# Patient Record
Sex: FEMALE | Race: WHITE | Employment: FULL TIME | URBAN - METROPOLITAN AREA
[De-identification: names, ages, dates, MRNs, and addresses within clinical notes are randomized per-mention and may not be internally consistent; named-entity substitution may affect disease eponyms.]

---

## 2018-01-25 PROBLEM — K80.50 CHOLEDOCHOLITHIASIS: Status: ACTIVE | Noted: 2018-01-25

## 2018-01-25 PROBLEM — Z97.5 IUD (INTRAUTERINE DEVICE) IN PLACE: Status: ACTIVE | Noted: 2018-01-25

## 2018-01-25 PROBLEM — D64.9 ANEMIA, UNSPECIFIED TYPE: Status: ACTIVE | Noted: 2018-01-25

## 2018-02-07 NOTE — ANESTHESIA POSTPROCEDURE EVALUATION
Patient: Rody Morales    Procedure Summary     Date:  02/07/18 Room / Location:  [address] [place] [address] ENDOSCOPY 01 / [address] [place] [address] ENDOSCOPY    Anesthesia Start:  7681 Anesthesia Stop:      Procedures:       ENDOSCOPIC RETROGRADE CHOLANGIOPANCREATOGRAPHY (ERCP) (N/A )

## 2018-02-07 NOTE — H&P
RE-PROCEDURE UPDATE    HPI: Cherie Alex is a 32year old female. 10/9/1990. Patient presents for an ERCP and EUS. ALLERGIES: No Known Allergies      No current outpatient prescriptions on file. History reviewed.  No pertinent past medic

## 2018-02-07 NOTE — ANESTHESIA PREPROCEDURE EVALUATION
Anesthesia PreOp Note    HPI:     Joshua Chappell is a 32year old female who presents for preoperative consultation requested by: Javi Ribeiro MD    Date of Surgery: 2/7/2018    Procedure(s):  ENDOSCOPIC RETROGRADE CHOLANGIOPANCREATOGRAPHY Value Date    01/25/2018   K 4.0 01/25/2018    01/25/2018   CO2 26.7 01/25/2018   BUN 13 01/25/2018   CREATSERUM 0.64 01/25/2018   GLU 84 01/25/2018          Vital Signs:   There is no height or weight on file to calculate BMI.   vitals were not

## 2018-02-07 NOTE — OPERATIVE REPORT
EUS ERCP REPORT    Patient Name:  Siri Knight, [address] [employer] Record #: [id number]  YOB: 1990  Date of Procedure: 2/7/2018    Referring physician: Durga Irwin MD     EUS  ERCP with biliary stent removal and CBD stone extraction

## 2018-02-08 NOTE — PROGRESS NOTES
Patent's cousin translated for patient. Patient stated that she had heartburn like chest pain post procedure and that Dr Rosamaria Cartagena was aware of it and said it was to be expected. The discomfort is almost gone today.  Instructed  patient to call Dr Rosamaria Cartagena if pa

## 2019-06-03 NOTE — PROGRESS NOTES
6/3/2019  9:21 AM    Diya Tinoco is a 29year old female. Chief complaint(s): Patient presents with:  New Patient: Pap Smear was done on 9/2018. Physical    HPI:     Diya Tinoco primary complaint is regarding CPE.      Diya Tinoco is a 2 today's visit):    No current outpatient medications on file. Allergies:  No Known Allergies      ROS:   Review of Systems   Constitutional: Negative for appetite change, chills, diaphoresis, fatigue and fever.    HENT: Negative for ear pain, hearing los rub.    No murmur heard. Pulmonary/Chest: No respiratory distress. Right breast exhibits no mass, no nipple discharge and no skin change. Left breast exhibits no mass, no nipple discharge and no skin change. Lungs clear to auscultation bilaterally.    Ab testing Screening     Gyn Additional Information       NOTE:  The Pap smear is a screening test that aids in the detection of cervical cancer and its precursors. False negative and false positive results can occur.  Regular sampling is recommended to minim foods); athletic conditioning, fluids; low fat milk, limit to less than 20 oz. a day; dental care with her dentist), and healthy habits & social competence & responsibilities: Recommendations on physical activity; exercise daily or at least 3 times a week

## 2019-06-07 NOTE — TELEPHONE ENCOUNTER
Dr. Francisco Clark pt called about her test results  And she was inform of your message below. Noted the TSH is 4.630 high ok for her to continue the same Thyroid dose?       Ref Range & Units 6/3/19 10:03 AM    TSH 0.358 - 3.740 mIU/mL 4.630High       Notes recorded b

## 2019-06-08 NOTE — TELEPHONE ENCOUNTER
Dr Darby Xioa, please advise on lab orders, enter diagnosis. Advised patient on Dr. Madonna Santiago information and recommendations. Patient verbalized understanding.      [id number]

## 2019-10-22 NOTE — PROGRESS NOTES
10/22/2019 1:39 PM    [address], : 10/9/1990  Patient presents with:  Sore Throat: sore throat, chest congestion, cough, ear pain, fever, chills,     HPI:     [address] is a 34year old female who presents for evaluation Surgical History:   Past Surgical History:   Procedure Laterality Date   •   x2   • CHOLECYSTECTOMY     • ENDOSCOPIC RETROGRADE CHOLANGIOPANCREATOGRAPHY (ERCP) N/A 2018    Performed by Sadiq Argueta MD at [address] [place] [address] on file    Social History Narrative      Not on file      IMMUNIZATIONS:  Immunization History  Administered            Date(s) Administered    TDAP                  06/03/2019      Allergies: No Known Allergies    Medications: No current outpatient medica capsule, Rfl: 0. LABORATORY & ORDERS: Blood test(s) ordered today ; Orders Placed This Encounter      POC Rapid Strep [[id number]]     RECOMMENDATIONS given include: Please, call our office with any questions or concerns.  Notify the doctor if there is a deter

## 2020-04-23 NOTE — TELEPHONE ENCOUNTER
Spoke with pt,  verified and she stated she missed a call from Dr Macario Lepe. pls call her back [phone].   TY.

## 2020-04-24 ENCOUNTER — TELEPHONE (OUTPATIENT)
Dept: FAMILY MEDICINE CLINIC | Facility: CLINIC | Age: 30
End: 2020-04-24

## 2020-05-02 NOTE — TELEPHONE ENCOUNTER
With assist of Language Line Left Message To Call Back    Several attempts to contact pt with no response. No response letter mailed to pt address on file.   MARLINE Clark

## 2020-06-09 NOTE — PATIENT INSTRUCTIONS
Acné   El acné es mich inflamación de la piel. Elle la adolescencia, hay un aumento en la producción de aceites de la piel en el abisai, el lisandro, el [place], la parte superior de la espalda y la parte superior de [place].  Estos aceites pueden obstru © 4675-6542 [address]. [address] [place], [address] [facility]. Todos los derechos reservados. Esta información no pretende sustituir la atención médica profesional. Sólo quinones médico puede diagnosticar y tratar un problema de shalonda. · Prueba de TSH. Esta prueba mide la cantidad de hormona estimulante de la tiroides (TSH, por gene siglas en inglés) que produce la glándula pituitaria.  Determina si el funcionamiento de la glándula tiroidea es normal, hiperactivo (hipertiroidismo) o defici Si tiene un nódulo, le realizarán mich aspiración con aguja. Es un tipo de biopsia. Radonna Ramirez es un procedimiento en el que se extrae mich muestra pequeña de tejido.  La FNA es la mejor prueba para determinar si las células de la glándula tiroidea son cance

## 2020-06-09 NOTE — PROGRESS NOTES
Joanie Proctor is a 34year old female.   HPI:   Patient here for noted painful lesions in face, states she has long h/o acne, has only used OTC products with poor response but has noted over the last few months new lesions that she is not able to squeeze w Conjunctiva/sclera: Conjunctivae normal.      Pupils: Pupils are equal, round, and reactive to light. Neck:      Musculoskeletal: Normal range of motion and neck supple. Cardiovascular:      Rate and Rhythm: Normal rate and regular rhythm.       Pulses:

## 2020-07-28 NOTE — PROGRESS NOTES
Bryce Pappas is a 34year old female. HPI:   Here for follow-up on last appointment, she has noted mild improvement in lesions in face and discomfort but no resolution.     Patient also complaining about significant pressure in tonsils, she has recurrent Mouth/Throat:      Pharynx: Oropharynx is clear. Uvula midline. Tonsils: No tonsillar exudate or tonsillar abscesses. Swellin+ on the right. 4+ on the left. Cardiovascular:      Rate and Rhythm: Normal rate and regular rhythm.       Heart so

## 2020-07-30 NOTE — PROGRESS NOTES
Ramona Freitas is a 34year old female. Patient presents with:   Tonsillitis: enlarged tonsils  Throat Problem: at times pt feels something stuck in her throat , per pt staets PCP told her due to enlarged tonsils       HISTORY OF PRESENT ILLNESS  She prese 12/12/2017         REVIEW OF SYSTEMS    System Neg/Pos Details   Constitutional Negative Fatigue, fever and weight loss. ENMT Negative Drooling. Eyes Negative Blurred vision and vision changes. Respiratory Negative Dyspnea and wheezing.    Cardio [employer] the right turbinates - Right: Normal, Left: Normal.  Nasal mucosa–congested       Current Outpatient Medications:   •  Loratadine-Pseudoephedrine ER 5-120 MG Oral Tablet 12 Hr, Take 1 tablet by mouth every 12 (twelve) hours. , Disp: 60 tablet, Rfl: 3  •  Az

## 2020-09-10 NOTE — TELEPHONE ENCOUNTER
With assist of COLEEN# [id number]  Action Requested: Summary for Provider     []  Critical Lab, Recommendations Needed  [] Need Additional Advice  []   FYI    []   Need Orders  [] Need Medications Sent to Pharmacy  []  Other     SUMMARY:Requesting Covid test  Pt s

## 2020-09-10 NOTE — PROGRESS NOTES
Virtual Telephone Check-In    Annalee Gonzalez verbally consents to a Virtual/Telephone Check-In visit on 09/10/20. Patient has been referred to the [facility] website at [email or web address] to review the yearly Consent to Treat document.     Patient Levell Hunger Lacy Adameer or the [facility] if there is a deterioration or worsening of the medical condition. Also, inform the doctor with any new symptoms or medications' side effects. Patient to call us or got to the ER if develops any SOB or difficult breathing.

## 2020-09-10 NOTE — TELEPHONE ENCOUNTER
Spoke with the patient who verbalized consent to a doximity visit. Appointment was scheduled for today 9/10/20.

## 2020-09-14 NOTE — TELEPHONE ENCOUNTER
Spoke with the patient and made her aware of the result from below. Patient voiced understanding. Patient requested a copy of the lab result to be left at the  for her , Zoanne Cabot, to .  Patient reports her  will pass by

## 2021-01-20 NOTE — TELEPHONE ENCOUNTER
With Language Line  Marwyatt Alejo ID# [id number]    Verified name and . Patient states that she has had chronic issues with acne and has seen Dr. Mary Ellen Escobar for her cystic acne before.  She states that she is having another flare up that has last for abou

## 2021-01-21 NOTE — PATIENT INSTRUCTIONS
Behavioral Health Assessment and Treatment    SETTING UP A BEHAVIORAL HEALTH ASSESSMENT    To schedule an assessment at any of our below locations call:   [phone]. Emergency walk-in assessments are available 24/7 at our [facility].

## 2021-01-21 NOTE — PROGRESS NOTES
De Licona is a 27year old female. HPI:   Multiple complaints, she had very good response to acne treatment but after she is still medication symptoms have now recurred.   Her mother has history of diabetes as well as her , she recently underwe Positive for behavioral problems and sleep disturbance. The patient is nervous/anxious.            EXAM:   /82 (BP Location: Left arm, Patient Position: Sitting, Cuff Size: adult)   Pulse 74   Ht 5' 3\" (1.6 m)   Wt 167 lb (75.8 kg)   LMP 01/05/2021 Screening cholesterol level    - LIPID PANEL    4. Palpitations  likely related to current stress and anxiety, will obtain baseline EKG  - EKG 12-LEAD; Future    5.  Adjustment disorder with mixed anxiety and depressed mood  with the stressors at home, prov

## 2021-04-27 NOTE — PROGRESS NOTES
4/27/2021  10:22 AM    Darrick Roldan is a 27year old female.     Chief complaint(s): Patient presents with:  Abnormal Liver Enzymes: Patient was seen in ER and told to have abn LFT levels   Anxiety: very anxious after a recent trauma, a younger x 1 month    Vaping Use      Vaping Use: Never used    Alcohol use: Yes      Comment: occasional    Drug use: Never       Immunizations:     Immunization History  Administered            Date(s) Administered    FLULAVAL 6 months & older 0.5 ml Pref Breastfeeding No   BMI 29.94 kg/m²     Physical Exam  Vitals reviewed. Constitutional:       General: She is not in acute distress. Appearance: Normal appearance. HENT:      Head: Normocephalic.    Eyes:      Conjunctiva/sclera: Conjunctivae normal. in the near future. Notify Dr Annetta Hassan or the [address] [place] [address] [place] if there is a deterioration or worsening of the medical condition. Also, inform the doctor with any new symptoms or medications' side effects.       FOLLOW-UP: Schedule a follow-up visit in  we

## 2021-05-27 NOTE — PROGRESS NOTES
5/27/2021  10:26 AM    Dwaine Berrios is a 27year old female.     Chief complaint(s): Patient presents with:  Test Results: f/u discuss ultrasound results   elevated LFTs  amenorrhea  HPI:     [facility] co Yes      Comment: occasional    Drug use: Never       Immunizations:     Immunization History  Administered            Date(s) Administered    FLULAVAL 6 months & older 0.5 ml Prefilled syringe (21124)                          01/21/2021      TDAP /87 (BP Location: Left arm, Patient Position: Sitting, Cuff Size: adult)   Pulse 70   Ht 5' 3\" (1.6 m)   Wt 174 lb (78.9 kg)   LMP 02/27/2021   Breastfeeding No   BMI 30.82 kg/m²     Physical Exam  Vitals reviewed.    Constitutional:       General: S ASSESSMENT/PLAN:   Assessment   Elevated lfts  (primary encounter diagnosis)  Fatty liver  Slow transit constipation  Amenorrhea    1. Elevated LFTs  2.  Fatty liver    RECOMMENDATIONS given include: Patient was reassured of  her medical condition and a deterioration or worsening of the medical condition. Also, inform the doctor with any new symptoms or medications' side effects. FOLLOW-UP: Schedule a follow-up visit in 1 month.          Orders This Visit:  No orders of the defined types were placed i

## 2021-11-23 NOTE — PROGRESS NOTES
11/23/2021  1:07 PM    Gabriela Osei is a 32year old female. Chief complaint(s): Patient presents with:  ER F/U: right hand injury    HPI:     Gabriela Osei primary complaint is regarding right thumb laceration. Used      Tobacco comment: socially when she was younger x 1 month    Vaping Use      Vaping Use: Never used    Alcohol use: Yes      Comment: occasional    Drug use: Never       Immunizations:     Immunization History  Administered            Date(s) Admi Neurological: Negative for dizziness and headaches. Psychiatric/Behavioral: Positive for dysphoric mood. The patient is nervous/anxious.         PHYSICAL EXAM:   VS: /78   Pulse 78   Ht 5' 3\" (1.6 m)   Wt 171 lb (77.6 kg)   LMP 02/27/2021   BMI 3 REFERRALS: OP REFERRAL TO PSYCHIATRY, Orders Placed This Encounter      OP REFERRAL TO PSYCHIATRY     RECOMMENDATIONS given include: Patient was reassured of  her medical condition and all questions and concerns were answered.  Patient was informed to p

## 2021-11-29 NOTE — PROGRESS NOTES
11/29/2021  10:14 AM    Caryl Diaz is a 32year old female. Chief complaint(s): Patient presents with:  Suture Removal: f/u    HPI:     Caryl Diaz primary complaint is regarding multiple complaints.      Jeanie Smoking status: Former Smoker        Packs/day: 0.00      Smokeless tobacco: Never Used      Tobacco comment: socially when she was younger x 1 month    Vaping Use      Vaping Use: Never used    Alcohol use: Yes      Comment: occasional    Drug use: Never (BP Location: Left arm, Patient Position: Sitting, Cuff Size: adult)   Pulse 72   Ht 5' 3\" (1.6 m)   Wt 173 lb (78.5 kg)   LMP 10/13/2021 (Approximate)   Breastfeeding No   BMI 30.65 kg/m²     Physical Exam  Vitals reviewed.    Constitutional:       Almarie Parikh Periumbilical abdominal pain  DDx early Ventral hernia    No heavy lifting  Observe for now  MEDICATIONS:   none       RECOMMENDATIONS given include: Patient was reassured of  her medical condition and all questions and concerns were answered.  Patient was

## 2022-02-15 NOTE — TELEPHONE ENCOUNTER
Dr. Azra Camilo     Please see message below     Labs were completed on 2/4/2022 but have not yet been reviewed    Please advise and thank you.   Please reply to dina: CORINA Galicia

## 2022-02-15 NOTE — TELEPHONE ENCOUNTER
Patient received a [facility] notification stating blood test results where ready, but Dr. Jackelyn Wilkes has not viewed. Patient requesting a call back with test results.

## 2022-02-16 NOTE — TELEPHONE ENCOUNTER
With ABRAM August  [place] ID# [id number]. Identified patient's name and . Informed patient of normal labs.

## 2022-03-01 PROBLEM — Z97.5 IUD (INTRAUTERINE DEVICE) IN PLACE: Status: RESOLVED | Noted: 2018-01-25 | Resolved: 2022-03-01

## 2022-03-17 NOTE — PROGRESS NOTES
Had Ob RN OB Education over the phone. Missed menses apt with:AIDEN  LMP: 21 Apro[employer] date    Pre  BMI: 30  EPDS score:3  +UPT at home: 2/3/22  +UPT in office: 22    US: 22  Working YENI: 10/13/22  Hx of genetic abnormality in family: Brother with mental retardation per pt but dx is unknown  Flu Vaccine: Yes up to date per pt  Covid Vaccine: Yes up to date per pt     Consent (if needed): Plans to have a repeat        Sterilization/Contraception: Interested in sterilization, stated spouce is considering a vasectomy. OUD Screening: Pt. Answered YES to any 5P questions and/or  risk factors are present. Pt's father had hx of alcohol abuse. Pt declined referral since she is connected with therapist and sees them regularly due to hx of anxiety. SBIRT completed. Pt. Education was provided on OUD and pregnancy, including the benefits of treatment for mom and baby and the risk of ADDISON. Pt to be giiven OUD and ADDISON handouts. Educational material reviewed with patient: Prenatal care, nutrition, weight gain recommendations, travel, exercise, intercourse, pregnancy changes, safe medications, pregnancy and work, fetal movement, labor and  labor, warning signs, food safety, tdap, cord blood, breastfeeding, circumcision, and Group B strep. Blood transfusion if needed: Yes    PN labs: Noted PCP ordered standard prenatal labs. Will add 24 hour protein for hx of pregnancy induced  hypertension per pt. Optional genetic screening labs were reviewed: Michelet Peguero, FTS with US, Quad screen MSAFP and CF screening. Pt interested in [address]. Order and referral to be made. Pt is HMO.    NOB apt: Apt scheduled with AIDEN

## 2022-03-17 NOTE — TELEPHONE ENCOUNTER
Please notify patient that in general patients can continue normal activity and work in pregnancy. No restrictions are placed. You may provide her with a note that says she would benefit from not lifting greater than 35 pounds and having the opportunity to use the bathroom every 2 hours. This does not constitute disability and patient needs to be aware of that. Her employer will ultimately decide what she can and cannot do.

## 2022-03-17 NOTE — TELEPHONE ENCOUNTER
Prenatal patient requesting work note for her employer with lifting restriction and request to be able to take bathrooms breaks as needed. 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 Please advise.

## 2022-04-08 PROBLEM — O34.219 PREVIOUS CESAREAN DELIVERY, ANTEPARTUM CONDITION OR COMPLICATION: Status: ACTIVE | Noted: 2022-04-08

## 2022-04-08 PROBLEM — O34.219 PREVIOUS CESAREAN DELIVERY, ANTEPARTUM CONDITION OR COMPLICATION (HCC): Status: ACTIVE | Noted: 2022-04-08

## 2022-04-11 ENCOUNTER — TELEPHONE (OUTPATIENT)
Dept: FAMILY MEDICINE CLINIC | Facility: CLINIC | Age: 32
End: 2022-04-11

## 2022-04-13 NOTE — PROGRESS NOTES
LMP 12/19/2021 (Approximate)         OB ULTRASOUND REPORT   See imaging tab for complete ultrasound report     Single IUP with cardiac activity 141 bpm  Amniotic fluid is normal.  Placenta location is posterior  Nasal bone present. Stated YENI is 10/13/22 the [address] today is consistent with YENI of 10/6/22. Fetus: arms and legs seen suboptimally. Fetal head/skull and abdomen appeared normal. Stomach and bladder seen. Uterus and adnexa appeared normal        The fetus is outside the range to do the First Trimester screen(> 14wks size). We offered cell free DNA testing or wait for QUAD screen. IMPRESSION:   1. IUP @  13w6d  2. Scan consistent with dates  3. No fetal structural abnormalities seen but limited by early gestational age      RECOMMENDATIONS:   1. Anatomy US at 20wks  2. Panorama test drawn today    Patient was scheduled for a  First Trimester screen today. Patient was NOT seen by [facility] physician. This was an ultrasound only encounter (no physician visit). The ultrasound was read by Dr. Farzaneh Anand and the report was sent to Dr. Noni Mann to discuss with the patient. Sanjuan Hodgkin, D.O. Maternal Fetal Medicine     Note to patient and family  The [facility] makes medical notes available to patients in the interest of transparency. However, please be advised that this is a medical document. It is intended as lmos-cn-xnsg communication. It is written and medical language may contain abbreviations or verbiage that are technical and unfamiliar. It may appear blunt or direct. Medical documents are intended to carry relevant information, facts as evident, and the clinical opinion of the practitioner.

## 2022-04-13 NOTE — PROGRESS NOTES
Single IUP with cardiac activity 141 bpm  Amniotic fluid is normal.  Placenta location is posterior  Nasal bone present. Stated YENI is 10/13/22 the [address] today is consistent with YENI of 10/6/22.

## 2022-04-25 NOTE — TELEPHONE ENCOUNTER
Panorama screening results obt  Reviewed by DR Marjorie Hawley    Results:  low risk  Low Risk for Aneuploidies, triploidy and Monosomy X  Fetal Sex: Held for       Pt states understanding  Copy of results sent for scanning into pt record

## 2022-05-11 NOTE — TELEPHONE ENCOUNTER
WITH LANGUAGE LINE LEFT 2 SEPARATE MESSAGES FOR PATIENT TO RETURN CALL. ONE OF THE MESSAGES WAS TO RETURN CALL WITH A CLEAR AND SLOW MESSAGE WITH TELEPHONE NUMBER TO RETURN CALL.

## 2022-05-12 NOTE — TELEPHONE ENCOUNTER
Routed to [facility] THE [facility] in error

## 2022-06-07 ENCOUNTER — HOSPITAL ENCOUNTER (OUTPATIENT)
Dept: PERINATAL CARE | Facility: HOSPITAL | Age: 32
Discharge: HOME | End: 2022-06-07
Attending: OBSTETRICS & GYNECOLOGY
Payer: COMMERCIAL

## 2022-06-07 VITALS
DIASTOLIC BLOOD PRESSURE: 81 MMHG | BODY MASS INDEX: 35 KG/M2 | SYSTOLIC BLOOD PRESSURE: 115 MMHG | WEIGHT: 199 LBS | HEART RATE: 94 BPM

## 2022-06-07 DIAGNOSIS — O99.212 OBESITY AFFECTING PREGNANCY IN SECOND TRIMESTER: Primary | ICD-10-CM

## 2022-06-07 DIAGNOSIS — O99.212 OBESITY AFFECTING PREGNANCY IN SECOND TRIMESTER: ICD-10-CM

## 2022-06-07 PROCEDURE — 76811 OB US DETAILED SNGL FETUS: CPT | Performed by: OBSTETRICS & GYNECOLOGY

## 2022-06-17 ENCOUNTER — ROUTINE PRENATAL (OUTPATIENT)
Dept: OBGYN CLINIC | Facility: CLINIC | Age: 32
End: 2022-06-17
Payer: COMMERCIAL

## 2022-06-17 VITALS — DIASTOLIC BLOOD PRESSURE: 62 MMHG | WEIGHT: 203 LBS | BODY MASS INDEX: 36 KG/M2 | SYSTOLIC BLOOD PRESSURE: 100 MMHG

## 2022-06-17 DIAGNOSIS — Z34.82 ENCOUNTER FOR SUPERVISION OF OTHER NORMAL PREGNANCY IN SECOND TRIMESTER: ICD-10-CM

## 2022-06-17 DIAGNOSIS — O34.219 PREVIOUS CESAREAN DELIVERY, ANTEPARTUM CONDITION OR COMPLICATION: Primary | ICD-10-CM

## 2022-06-17 LAB
APPEARANCE: CLEAR
BILIRUBIN: NEGATIVE
GLUCOSE (URINE DIPSTICK): NEGATIVE MG/DL
KETONES (URINE DIPSTICK): NEGATIVE MG/DL
LEUKOCYTES: NEGATIVE
MULTISTIX LOT#: 1062 NUMERIC
NITRITE, URINE: NEGATIVE
OCCULT BLOOD: NEGATIVE
PH, URINE: 7 (ref 4.5–8)
PROTEIN (URINE DIPSTICK): NEGATIVE MG/DL
SPECIFIC GRAVITY: 1.01 (ref 1–1.03)
URINE-COLOR: YELLOW
UROBILINOGEN,SEMI-QN: 0.2 MG/DL (ref 0–1.9)

## 2022-06-17 RX ORDER — SWAB
1 SWAB, NON-MEDICATED MISCELLANEOUS DAILY
Qty: 90 TABLET | Refills: 1 | Status: SHIPPED | OUTPATIENT
Start: 2022-06-17 | End: 2022-07-17

## 2022-07-08 ENCOUNTER — TELEPHONE (OUTPATIENT)
Dept: OBGYN CLINIC | Facility: CLINIC | Age: 32
End: 2022-07-08

## 2022-07-08 NOTE — TELEPHONE ENCOUNTER
Pt has an appointment today with Dentist for a cleaning and they are needing a clearance note.  Please advise Fax #[phone]

## 2022-07-10 ENCOUNTER — HOSPITAL ENCOUNTER (EMERGENCY)
Facility: HOSPITAL | Age: 32
Discharge: HOME | End: 2022-07-10
Attending: EMERGENCY MEDICINE
Payer: COMMERCIAL

## 2022-07-10 VITALS
OXYGEN SATURATION: 99 % | RESPIRATION RATE: 19 BRPM | HEART RATE: 86 BPM | SYSTOLIC BLOOD PRESSURE: 133 MMHG | HEIGHT: 63 IN | DIASTOLIC BLOOD PRESSURE: 82 MMHG | BODY MASS INDEX: 37.21 KG/M2 | WEIGHT: 210 LBS | TEMPERATURE: 98 F

## 2022-07-10 DIAGNOSIS — E87.6 HYPOKALEMIA: ICD-10-CM

## 2022-07-10 DIAGNOSIS — R20.2 PARESTHESIAS: Primary | ICD-10-CM

## 2022-07-10 LAB
ANION GAP SERPL CALC-SCNC: 9 MMOL/L (ref 0–18)
BASOPHILS # BLD AUTO: 0.03 X10(3) UL (ref 0–0.2)
BASOPHILS NFR BLD AUTO: 0.3 %
BUN BLD-MCNC: 7 MG/DL (ref 7–18)
BUN/CREAT SERPL: 13 (ref 10–20)
CALCIUM BLD-MCNC: 8.4 MG/DL (ref 8.5–10.1)
CHLORIDE SERPL-SCNC: 109 MMOL/L (ref 98–112)
CO2 SERPL-SCNC: 22 MMOL/L (ref 21–32)
CREAT BLD-MCNC: 0.54 MG/DL
D DIMER PPP FEU-MCNC: 0.53 UG/ML FEU (ref ?–0.5)
DEPRECATED RDW RBC AUTO: 42.6 FL (ref 35.1–46.3)
EOSINOPHIL # BLD AUTO: 0.16 X10(3) UL (ref 0–0.7)
EOSINOPHIL NFR BLD AUTO: 1.6 %
ERYTHROCYTE [DISTWIDTH] IN BLOOD BY AUTOMATED COUNT: 13.1 % (ref 11–15)
GLUCOSE BLD-MCNC: 105 MG/DL (ref 70–99)
HCT VFR BLD AUTO: 33.8 %
HGB BLD-MCNC: 11.4 G/DL
IMM GRANULOCYTES # BLD AUTO: 0.04 X10(3) UL (ref 0–1)
IMM GRANULOCYTES NFR BLD: 0.4 %
LYMPHOCYTES # BLD AUTO: 3.26 X10(3) UL (ref 1–4)
LYMPHOCYTES NFR BLD AUTO: 31.7 %
MCH RBC QN AUTO: 30.1 PG (ref 26–34)
MCHC RBC AUTO-ENTMCNC: 33.7 G/DL (ref 31–37)
MCV RBC AUTO: 89.2 FL
MONOCYTES # BLD AUTO: 0.47 X10(3) UL (ref 0.1–1)
MONOCYTES NFR BLD AUTO: 4.6 %
NEUTROPHILS # BLD AUTO: 6.32 X10 (3) UL (ref 1.5–7.7)
NEUTROPHILS # BLD AUTO: 6.32 X10(3) UL (ref 1.5–7.7)
NEUTROPHILS NFR BLD AUTO: 61.4 %
OSMOLALITY SERPL CALC.SUM OF ELEC: 288 MOSM/KG (ref 275–295)
PLATELET # BLD AUTO: 266 10(3)UL (ref 150–450)
POTASSIUM SERPL-SCNC: 3.4 MMOL/L (ref 3.5–5.1)
RBC # BLD AUTO: 3.79 X10(6)UL
SODIUM SERPL-SCNC: 140 MMOL/L (ref 136–145)
WBC # BLD AUTO: 10.3 X10(3) UL (ref 4–11)

## 2022-07-10 PROCEDURE — 80048 BASIC METABOLIC PNL TOTAL CA: CPT | Performed by: EMERGENCY MEDICINE

## 2022-07-10 PROCEDURE — 85379 FIBRIN DEGRADATION QUANT: CPT | Performed by: EMERGENCY MEDICINE

## 2022-07-10 PROCEDURE — 99283 EMERGENCY DEPT VISIT LOW MDM: CPT

## 2022-07-10 PROCEDURE — 85379 FIBRIN DEGRADATION QUANT: CPT

## 2022-07-10 PROCEDURE — 36415 COLL VENOUS BLD VENIPUNCTURE: CPT

## 2022-07-10 PROCEDURE — 80048 BASIC METABOLIC PNL TOTAL CA: CPT

## 2022-07-10 PROCEDURE — 85025 COMPLETE CBC W/AUTO DIFF WBC: CPT

## 2022-07-10 PROCEDURE — 85025 COMPLETE CBC W/AUTO DIFF WBC: CPT | Performed by: EMERGENCY MEDICINE

## 2022-07-10 RX ORDER — POTASSIUM CHLORIDE 1.5 G/1.77G
40 POWDER, FOR SOLUTION ORAL ONCE
Status: COMPLETED | OUTPATIENT
Start: 2022-07-10 | End: 2022-07-10

## 2022-07-10 NOTE — ED QUICK NOTES
Pt strength equal bilateral for upper and lower extremities. No facial droop noted. Equal bilateral sensation in upper extremities, Sensation R > L in lower extremities.

## 2022-07-10 NOTE — ED INITIAL ASSESSMENT (HPI)
Pt arrives to ED with c/o 27 weeks pregnant. Pt states she's been having a tingling sensation in bilateral hands and left leg that been happening off and on but more frequent today. Pt also reports left leg pain.

## 2022-07-14 ENCOUNTER — OFFICE VISIT (OUTPATIENT)
Dept: FAMILY MEDICINE CLINIC | Facility: CLINIC | Age: 32
End: 2022-07-14
Payer: COMMERCIAL

## 2022-07-14 VITALS
SYSTOLIC BLOOD PRESSURE: 132 MMHG | HEART RATE: 102 BPM | DIASTOLIC BLOOD PRESSURE: 88 MMHG | BODY MASS INDEX: 35.79 KG/M2 | HEIGHT: 63 IN | WEIGHT: 202 LBS

## 2022-07-14 DIAGNOSIS — G56.03 BILATERAL CARPAL TUNNEL SYNDROME: Primary | ICD-10-CM

## 2022-07-14 DIAGNOSIS — G57.12 LATERAL FEMORAL CUTANEOUS ENTRAPMENT SYNDROME, LEFT: ICD-10-CM

## 2022-07-14 PROCEDURE — 3075F SYST BP GE 130 - 139MM HG: CPT | Performed by: FAMILY MEDICINE

## 2022-07-14 PROCEDURE — 99213 OFFICE O/P EST LOW 20 MIN: CPT | Performed by: FAMILY MEDICINE

## 2022-07-14 PROCEDURE — 3079F DIAST BP 80-89 MM HG: CPT | Performed by: FAMILY MEDICINE

## 2022-07-14 PROCEDURE — 3008F BODY MASS INDEX DOCD: CPT | Performed by: FAMILY MEDICINE

## 2022-07-26 ENCOUNTER — LAB ENCOUNTER (OUTPATIENT)
Dept: LAB | Facility: HOSPITAL | Age: 32
End: 2022-07-26
Attending: OBSTETRICS & GYNECOLOGY
Payer: COMMERCIAL

## 2022-07-26 ENCOUNTER — ROUTINE PRENATAL (OUTPATIENT)
Dept: OBGYN CLINIC | Facility: CLINIC | Age: 32
End: 2022-07-26
Payer: COMMERCIAL

## 2022-07-26 VITALS — WEIGHT: 204 LBS | BODY MASS INDEX: 36 KG/M2 | DIASTOLIC BLOOD PRESSURE: 70 MMHG | SYSTOLIC BLOOD PRESSURE: 112 MMHG

## 2022-07-26 DIAGNOSIS — Z34.82 ENCOUNTER FOR SUPERVISION OF OTHER NORMAL PREGNANCY IN SECOND TRIMESTER: ICD-10-CM

## 2022-07-26 DIAGNOSIS — Z34.83 ENCOUNTER FOR SUPERVISION OF OTHER NORMAL PREGNANCY IN THIRD TRIMESTER: Primary | ICD-10-CM

## 2022-07-26 LAB
APPEARANCE: CLEAR
BASOPHILS # BLD AUTO: 0.04 X10(3) UL (ref 0–0.2)
BASOPHILS NFR BLD AUTO: 0.4 %
BILIRUBIN: NEGATIVE
DEPRECATED RDW RBC AUTO: 42.7 FL (ref 35.1–46.3)
EOSINOPHIL # BLD AUTO: 0.16 X10(3) UL (ref 0–0.7)
EOSINOPHIL NFR BLD AUTO: 1.7 %
ERYTHROCYTE [DISTWIDTH] IN BLOOD BY AUTOMATED COUNT: 13.1 % (ref 11–15)
GLUCOSE (URINE DIPSTICK): NEGATIVE MG/DL
GLUCOSE 1H P GLC SERPL-MCNC: 117 MG/DL
HCT VFR BLD AUTO: 33.9 %
HGB BLD-MCNC: 11.3 G/DL
IMM GRANULOCYTES # BLD AUTO: 0.05 X10(3) UL (ref 0–1)
IMM GRANULOCYTES NFR BLD: 0.5 %
KETONES (URINE DIPSTICK): NEGATIVE MG/DL
LEUKOCYTES: NEGATIVE
LYMPHOCYTES # BLD AUTO: 2.42 X10(3) UL (ref 1–4)
LYMPHOCYTES NFR BLD AUTO: 25.2 %
MCH RBC QN AUTO: 30 PG (ref 26–34)
MCHC RBC AUTO-ENTMCNC: 33.3 G/DL (ref 31–37)
MCV RBC AUTO: 89.9 FL
MONOCYTES # BLD AUTO: 0.44 X10(3) UL (ref 0.1–1)
MONOCYTES NFR BLD AUTO: 4.6 %
MULTISTIX LOT#: ABNORMAL NUMERIC
NEUTROPHILS # BLD AUTO: 6.51 X10 (3) UL (ref 1.5–7.7)
NEUTROPHILS # BLD AUTO: 6.51 X10(3) UL (ref 1.5–7.7)
NEUTROPHILS NFR BLD AUTO: 67.6 %
NITRITE, URINE: NEGATIVE
PH, URINE: 7 (ref 4.5–8)
PLATELET # BLD AUTO: 240 10(3)UL (ref 150–450)
PROTEIN (URINE DIPSTICK): 30 MG/DL
RBC # BLD AUTO: 3.77 X10(6)UL
SPECIFIC GRAVITY: 1.01 (ref 1–1.03)
URINE-COLOR: YELLOW
UROBILINOGEN,SEMI-QN: 0.2 MG/DL (ref 0–1.9)
WBC # BLD AUTO: 9.6 X10(3) UL (ref 4–11)

## 2022-07-26 PROCEDURE — 81003 URINALYSIS AUTO W/O SCOPE: CPT | Performed by: NURSE PRACTITIONER

## 2022-07-26 PROCEDURE — 90471 IMMUNIZATION ADMIN: CPT | Performed by: NURSE PRACTITIONER

## 2022-07-26 PROCEDURE — 82950 GLUCOSE TEST: CPT

## 2022-07-26 PROCEDURE — 90715 TDAP VACCINE 7 YRS/> IM: CPT | Performed by: NURSE PRACTITIONER

## 2022-07-26 PROCEDURE — 36415 COLL VENOUS BLD VENIPUNCTURE: CPT

## 2022-07-26 PROCEDURE — 3078F DIAST BP <80 MM HG: CPT | Performed by: NURSE PRACTITIONER

## 2022-07-26 PROCEDURE — 3074F SYST BP LT 130 MM HG: CPT | Performed by: NURSE PRACTITIONER

## 2022-07-26 PROCEDURE — 85025 COMPLETE CBC W/AUTO DIFF WBC: CPT

## 2022-07-26 RX ORDER — BREAST PUMP
EACH MISCELLANEOUS
Qty: 1 EACH | Refills: 0 | Status: SHIPPED | OUTPATIENT
Start: 2022-07-26

## 2022-07-27 LAB
APPEARANCE: CLEAR
BILIRUBIN: NEGATIVE
GLUCOSE (URINE DIPSTICK): NEGATIVE MG/DL
KETONES (URINE DIPSTICK): NEGATIVE MG/DL
LEUKOCYTES: NEGATIVE
MULTISTIX LOT#: NORMAL NUMERIC
NITRITE, URINE: NEGATIVE
OCCULT BLOOD: NEGATIVE
PH, URINE: 7 (ref 4.5–8)
PROTEIN (URINE DIPSTICK): NEGATIVE MG/DL
SPECIFIC GRAVITY: 1.01 (ref 1–1.03)
URINE-COLOR: YELLOW
UROBILINOGEN,SEMI-QN: 0.2 MG/DL (ref 0–1.9)

## 2022-08-09 ENCOUNTER — ROUTINE PRENATAL (OUTPATIENT)
Dept: OBGYN CLINIC | Facility: CLINIC | Age: 32
End: 2022-08-09
Payer: COMMERCIAL

## 2022-08-09 VITALS — WEIGHT: 201 LBS | DIASTOLIC BLOOD PRESSURE: 80 MMHG | SYSTOLIC BLOOD PRESSURE: 122 MMHG | BODY MASS INDEX: 36 KG/M2

## 2022-08-09 DIAGNOSIS — Z34.83 ENCOUNTER FOR SUPERVISION OF OTHER NORMAL PREGNANCY IN THIRD TRIMESTER: Primary | ICD-10-CM

## 2022-08-09 LAB
APPEARANCE: CLEAR
BILIRUBIN: NEGATIVE
GLUCOSE (URINE DIPSTICK): NEGATIVE MG/DL
KETONES (URINE DIPSTICK): NEGATIVE MG/DL
LEUKOCYTES: NEGATIVE
MULTISTIX LOT#: NORMAL NUMERIC
NITRITE, URINE: NEGATIVE
OCCULT BLOOD: NEGATIVE
PH, URINE: 5 (ref 4.5–8)
PROTEIN (URINE DIPSTICK): NEGATIVE MG/DL
SPECIFIC GRAVITY: 1 (ref 1–1.03)
URINE-COLOR: YELLOW
UROBILINOGEN,SEMI-QN: 0.2 MG/DL (ref 0–1.9)

## 2022-08-09 PROCEDURE — 0502F SUBSEQUENT PRENATAL CARE: CPT | Performed by: OBSTETRICS & GYNECOLOGY

## 2022-08-09 PROCEDURE — 3079F DIAST BP 80-89 MM HG: CPT | Performed by: OBSTETRICS & GYNECOLOGY

## 2022-08-09 PROCEDURE — 3074F SYST BP LT 130 MM HG: CPT | Performed by: OBSTETRICS & GYNECOLOGY

## 2022-08-09 PROCEDURE — 81002 URINALYSIS NONAUTO W/O SCOPE: CPT | Performed by: OBSTETRICS & GYNECOLOGY

## 2022-08-22 ENCOUNTER — HOSPITAL ENCOUNTER (OUTPATIENT)
Dept: PERINATAL CARE | Facility: HOSPITAL | Age: 32
End: 2022-08-22
Attending: OBSTETRICS & GYNECOLOGY
Payer: COMMERCIAL

## 2022-08-22 ENCOUNTER — HOSPITAL ENCOUNTER (OUTPATIENT)
Dept: PERINATAL CARE | Facility: HOSPITAL | Age: 32
Discharge: HOME | End: 2022-08-22
Attending: OBSTETRICS & GYNECOLOGY
Payer: COMMERCIAL

## 2022-08-22 VITALS
WEIGHT: 201 LBS | DIASTOLIC BLOOD PRESSURE: 81 MMHG | BODY MASS INDEX: 36 KG/M2 | HEART RATE: 96 BPM | SYSTOLIC BLOOD PRESSURE: 122 MMHG

## 2022-08-22 DIAGNOSIS — K80.50 CHOLEDOCHOLITHIASIS: ICD-10-CM

## 2022-08-22 DIAGNOSIS — O99.213 OBESITY AFFECTING PREGNANCY IN THIRD TRIMESTER: ICD-10-CM

## 2022-08-22 DIAGNOSIS — O34.219 PREVIOUS CESAREAN DELIVERY, ANTEPARTUM CONDITION OR COMPLICATION: Primary | ICD-10-CM

## 2022-08-22 DIAGNOSIS — O34.219 PREVIOUS CESAREAN DELIVERY, ANTEPARTUM CONDITION OR COMPLICATION: ICD-10-CM

## 2022-08-22 PROCEDURE — 76819 FETAL BIOPHYS PROFIL W/O NST: CPT

## 2022-08-22 PROCEDURE — 76816 OB US FOLLOW-UP PER FETUS: CPT | Performed by: OBSTETRICS & GYNECOLOGY

## 2022-08-23 ENCOUNTER — ROUTINE PRENATAL (OUTPATIENT)
Dept: OBGYN CLINIC | Facility: CLINIC | Age: 32
End: 2022-08-23
Payer: COMMERCIAL

## 2022-08-23 VITALS — WEIGHT: 211 LBS | BODY MASS INDEX: 37 KG/M2 | DIASTOLIC BLOOD PRESSURE: 70 MMHG | SYSTOLIC BLOOD PRESSURE: 130 MMHG

## 2022-08-23 DIAGNOSIS — Z34.83 ENCOUNTER FOR SUPERVISION OF OTHER NORMAL PREGNANCY IN THIRD TRIMESTER: Primary | ICD-10-CM

## 2022-08-23 LAB
APPEARANCE: CLEAR
BILIRUBIN: NEGATIVE
GLUCOSE (URINE DIPSTICK): NEGATIVE MG/DL
KETONES (URINE DIPSTICK): NEGATIVE MG/DL
LEUKOCYTES: NEGATIVE
MULTISTIX LOT#: ABNORMAL NUMERIC
NITRITE, URINE: NEGATIVE
PH, URINE: 7 (ref 4.5–8)
PROTEIN (URINE DIPSTICK): NEGATIVE MG/DL
SPECIFIC GRAVITY: 1.01 (ref 1–1.03)
URINE-COLOR: YELLOW
UROBILINOGEN,SEMI-QN: 0.2 MG/DL (ref 0–1.9)

## 2022-08-23 PROCEDURE — 3075F SYST BP GE 130 - 139MM HG: CPT | Performed by: OBSTETRICS & GYNECOLOGY

## 2022-08-23 PROCEDURE — 81003 URINALYSIS AUTO W/O SCOPE: CPT | Performed by: OBSTETRICS & GYNECOLOGY

## 2022-08-23 PROCEDURE — 3078F DIAST BP <80 MM HG: CPT | Performed by: OBSTETRICS & GYNECOLOGY

## 2022-08-23 PROCEDURE — 0502F SUBSEQUENT PRENATAL CARE: CPT | Performed by: OBSTETRICS & GYNECOLOGY

## 2022-08-23 NOTE — PROGRESS NOTES
No C/Os. OB ultrasound reviewed. [facility] for ligament pain.

## 2022-08-26 ENCOUNTER — TELEPHONE (OUTPATIENT)
Dept: OBGYN CLINIC | Facility: CLINIC | Age: 32
End: 2022-08-26

## 2022-08-26 NOTE — TELEPHONE ENCOUNTER
ID #:[id number],  Molly Martinez     Patient endorses: intermittent pain x 2 days  Tightening of abdomen , similar to menstrual cramps radiating through pelvis to low back  Rates pain 8/10 on 10 point scale  Frequency q 60 min  Denies VB, LOF   Good FM      Advised to push fluids, and monitor FM and UCs  Labor precautions reviewed. Verbalizes understanding and agrees with plan.

## 2022-09-07 ENCOUNTER — TELEPHONE (OUTPATIENT)
Dept: OBGYN CLINIC | Facility: CLINIC | Age: 32
End: 2022-09-07

## 2022-09-07 NOTE — TELEPHONE ENCOUNTER
Yecenia, [facility] calling to inform form for breast pump will be faxed today, thanks.

## 2022-09-08 ENCOUNTER — TELEPHONE (OUTPATIENT)
Dept: OBGYN CLINIC | Facility: CLINIC | Age: 32
End: 2022-09-08

## 2022-09-13 ENCOUNTER — HOSPITAL ENCOUNTER (OUTPATIENT)
Facility: HOSPITAL | Age: 32
Setting detail: OBSERVATION
Discharge: HOME | End: 2022-09-15
Attending: OBSTETRICS & GYNECOLOGY | Admitting: OBSTETRICS & GYNECOLOGY

## 2022-09-13 ENCOUNTER — ROUTINE PRENATAL (OUTPATIENT)
Dept: OBGYN CLINIC | Facility: CLINIC | Age: 32
End: 2022-09-13
Payer: COMMERCIAL

## 2022-09-13 VITALS — BODY MASS INDEX: 38 KG/M2 | SYSTOLIC BLOOD PRESSURE: 124 MMHG | WEIGHT: 213 LBS | DIASTOLIC BLOOD PRESSURE: 82 MMHG

## 2022-09-13 DIAGNOSIS — Z34.83 ENCOUNTER FOR SUPERVISION OF OTHER NORMAL PREGNANCY IN THIRD TRIMESTER: Primary | ICD-10-CM

## 2022-09-13 PROBLEM — Z34.90 PREGNANCY (HCC): Status: ACTIVE | Noted: 2022-09-13

## 2022-09-13 PROBLEM — Z34.90 PREGNANCY: Status: ACTIVE | Noted: 2022-09-13

## 2022-09-13 PROBLEM — O14.13 SEVERE PRE-ECLAMPSIA IN THIRD TRIMESTER (HCC): Status: ACTIVE | Noted: 2022-09-13

## 2022-09-13 PROBLEM — O14.13 SEVERE PRE-ECLAMPSIA IN THIRD TRIMESTER: Status: ACTIVE | Noted: 2022-09-13

## 2022-09-13 LAB
ALBUMIN SERPL-MCNC: 2.6 G/DL (ref 3.4–5)
ALBUMIN/GLOB SERPL: 0.6 {RATIO} (ref 1–2)
ALP LIVER SERPL-CCNC: 140 U/L
ALT SERPL-CCNC: 17 U/L
ANION GAP SERPL CALC-SCNC: 9 MMOL/L (ref 0–18)
ANTIBODY SCREEN: NEGATIVE
AST SERPL-CCNC: 14 U/L (ref 15–37)
BASOPHILS # BLD AUTO: 0.03 X10(3) UL (ref 0–0.2)
BASOPHILS NFR BLD AUTO: 0.3 %
BILIRUB SERPL-MCNC: 0.3 MG/DL (ref 0.1–2)
BILIRUBIN: NEGATIVE
BUN BLD-MCNC: 10 MG/DL (ref 7–18)
BUN/CREAT SERPL: 13.7 (ref 10–20)
CALCIUM BLD-MCNC: 9.4 MG/DL (ref 8.5–10.1)
CHLORIDE SERPL-SCNC: 109 MMOL/L (ref 98–112)
CO2 SERPL-SCNC: 20 MMOL/L (ref 21–32)
CREAT BLD-MCNC: 0.73 MG/DL
CREAT UR-SCNC: 15.1 MG/DL
DEPRECATED HBV CORE AB SER IA-ACNC: 9.9 NG/ML
DEPRECATED RDW RBC AUTO: 42.1 FL (ref 35.1–46.3)
EOSINOPHIL # BLD AUTO: 0.05 X10(3) UL (ref 0–0.7)
EOSINOPHIL NFR BLD AUTO: 0.5 %
ERYTHROCYTE [DISTWIDTH] IN BLOOD BY AUTOMATED COUNT: 13.3 % (ref 11–15)
FASTING STATUS PATIENT QL REPORTED: NO
GFR SERPLBLD BASED ON 1.73 SQ M-ARVRAT: 113 ML/MIN/1.73M2 (ref 60–?)
GLOBULIN PLAS-MCNC: 4.2 G/DL (ref 2.8–4.4)
GLUCOSE (URINE DIPSTICK): NEGATIVE MG/DL
GLUCOSE BLD-MCNC: 74 MG/DL (ref 70–99)
HCT VFR BLD AUTO: 35.9 %
HGB BLD-MCNC: 12.3 G/DL
HIV 1+2 AB+HIV1 P24 AG SERPL QL IA: NONREACTIVE
IMM GRANULOCYTES # BLD AUTO: 0.04 X10(3) UL (ref 0–1)
IMM GRANULOCYTES NFR BLD: 0.4 %
KETONES (URINE DIPSTICK): NEGATIVE MG/DL
LEUKOCYTES: NEGATIVE
LYMPHOCYTES # BLD AUTO: 2.76 X10(3) UL (ref 1–4)
LYMPHOCYTES NFR BLD AUTO: 28.1 %
MCH RBC QN AUTO: 29.9 PG (ref 26–34)
MCHC RBC AUTO-ENTMCNC: 34.3 G/DL (ref 31–37)
MCV RBC AUTO: 87.3 FL
MONOCYTES # BLD AUTO: 0.46 X10(3) UL (ref 0.1–1)
MONOCYTES NFR BLD AUTO: 4.7 %
MULTISTIX LOT#: ABNORMAL NUMERIC
NEUTROPHILS # BLD AUTO: 6.49 X10 (3) UL (ref 1.5–7.7)
NEUTROPHILS # BLD AUTO: 6.49 X10(3) UL (ref 1.5–7.7)
NEUTROPHILS NFR BLD AUTO: 66 %
NITRITE, URINE: NEGATIVE
OCCULT BLOOD: NEGATIVE
OSMOLALITY SERPL CALC.SUM OF ELEC: 284 MOSM/KG (ref 275–295)
PH, URINE: 8 (ref 4.5–8)
PLATELET # BLD AUTO: 232 10(3)UL (ref 150–450)
POTASSIUM SERPL-SCNC: 3.6 MMOL/L (ref 3.5–5.1)
PROT SERPL-MCNC: 6.8 G/DL (ref 6.4–8.2)
PROT UR-MCNC: 26.1 MG/DL
PROT/CREAT UR-RTO: 1.73
PROTEIN (URINE DIPSTICK): >=300 MG/DL
RBC # BLD AUTO: 4.11 X10(6)UL
RH BLOOD TYPE: POSITIVE
RH BLOOD TYPE: POSITIVE
SARS-COV-2 RNA RESP QL NAA+PROBE: NOT DETECTED
SODIUM SERPL-SCNC: 138 MMOL/L (ref 136–145)
SPECIFIC GRAVITY: 1 (ref 1–1.03)
URINE-COLOR: YELLOW
UROBILINOGEN,SEMI-QN: 0.2 MG/DL (ref 0–1.9)
WBC # BLD AUTO: 9.8 X10(3) UL (ref 4–11)

## 2022-09-13 PROCEDURE — 82570 ASSAY OF URINE CREATININE: CPT | Performed by: OBSTETRICS & GYNECOLOGY

## 2022-09-13 PROCEDURE — 87653 STREP B DNA AMP PROBE: CPT | Performed by: OBSTETRICS & GYNECOLOGY

## 2022-09-13 PROCEDURE — 87081 CULTURE SCREEN ONLY: CPT | Performed by: OBSTETRICS & GYNECOLOGY

## 2022-09-13 PROCEDURE — 82728 ASSAY OF FERRITIN: CPT | Performed by: OBSTETRICS & GYNECOLOGY

## 2022-09-13 PROCEDURE — 86701 HIV-1ANTIBODY: CPT | Performed by: OBSTETRICS & GYNECOLOGY

## 2022-09-13 PROCEDURE — 84156 ASSAY OF PROTEIN URINE: CPT | Performed by: OBSTETRICS & GYNECOLOGY

## 2022-09-13 PROCEDURE — 86780 TREPONEMA PALLIDUM: CPT | Performed by: OBSTETRICS & GYNECOLOGY

## 2022-09-13 PROCEDURE — 96372 THER/PROPH/DIAG INJ SC/IM: CPT

## 2022-09-13 PROCEDURE — 85025 COMPLETE CBC W/AUTO DIFF WBC: CPT | Performed by: OBSTETRICS & GYNECOLOGY

## 2022-09-13 PROCEDURE — 99214 OFFICE O/P EST MOD 30 MIN: CPT

## 2022-09-13 PROCEDURE — 86901 BLOOD TYPING SEROLOGIC RH(D): CPT | Performed by: OBSTETRICS & GYNECOLOGY

## 2022-09-13 PROCEDURE — 86900 BLOOD TYPING SEROLOGIC ABO: CPT | Performed by: OBSTETRICS & GYNECOLOGY

## 2022-09-13 PROCEDURE — 36415 COLL VENOUS BLD VENIPUNCTURE: CPT

## 2022-09-13 PROCEDURE — 80053 COMPREHEN METABOLIC PANEL: CPT | Performed by: OBSTETRICS & GYNECOLOGY

## 2022-09-13 PROCEDURE — 86850 RBC ANTIBODY SCREEN: CPT | Performed by: OBSTETRICS & GYNECOLOGY

## 2022-09-13 RX ORDER — SODIUM CHLORIDE, SODIUM LACTATE, POTASSIUM CHLORIDE, CALCIUM CHLORIDE 600; 310; 30; 20 MG/100ML; MG/100ML; MG/100ML; MG/100ML
INJECTION, SOLUTION INTRAVENOUS CONTINUOUS
Status: DISCONTINUED | OUTPATIENT
Start: 2022-09-13 | End: 2022-09-15

## 2022-09-13 RX ORDER — BETAMETHASONE SODIUM PHOSPHATE AND BETAMETHASONE ACETATE 3; 3 MG/ML; MG/ML
12 INJECTION, SUSPENSION INTRA-ARTICULAR; INTRALESIONAL; INTRAMUSCULAR; SOFT TISSUE EVERY 24 HOURS
Status: COMPLETED | OUTPATIENT
Start: 2022-09-13 | End: 2022-09-14

## 2022-09-13 RX ORDER — HYDRALAZINE HYDROCHLORIDE 20 MG/ML
10 INJECTION INTRAMUSCULAR; INTRAVENOUS ONCE AS NEEDED
Status: DISCONTINUED | OUTPATIENT
Start: 2022-09-13 | End: 2022-09-15

## 2022-09-13 RX ORDER — LABETALOL HYDROCHLORIDE 5 MG/ML
20 INJECTION, SOLUTION INTRAVENOUS ONCE AS NEEDED
Status: DISCONTINUED | OUTPATIENT
Start: 2022-09-13 | End: 2022-09-15

## 2022-09-13 RX ORDER — LABETALOL HYDROCHLORIDE 5 MG/ML
40 INJECTION, SOLUTION INTRAVENOUS ONCE AS NEEDED
Status: DISCONTINUED | OUTPATIENT
Start: 2022-09-13 | End: 2022-09-15

## 2022-09-13 RX ORDER — LABETALOL HYDROCHLORIDE 5 MG/ML
80 INJECTION, SOLUTION INTRAVENOUS ONCE AS NEEDED
Status: DISCONTINUED | OUTPATIENT
Start: 2022-09-13 | End: 2022-09-15

## 2022-09-13 NOTE — PLAN OF CARE
Pt is a 32year old female admitted to 371/371-A. Patient presents with:  R/o Pih: MELVIN and vision changes. BP normal in office. protein in urine >300  Scheduled : 1530 on 9/15/22  Proteinuria     Pt is  35w5d intra-uterine pregnancy. History obtained, consents signed. Oriented to room, staff, and plan of care.

## 2022-09-13 NOTE — PROGRESS NOTES
Pt c/o visual changes and headache yesterday, bp 124/82 today and protein 300mg,  Pt sent immediately for evaluation for r/o preeclampsia in FB,   Dilma moreno appraised.

## 2022-09-14 LAB — T PALLIDUM AB SER QL: NEGATIVE

## 2022-09-14 PROCEDURE — 96372 THER/PROPH/DIAG INJ SC/IM: CPT

## 2022-09-14 PROCEDURE — 96360 HYDRATION IV INFUSION INIT: CPT

## 2022-09-14 PROCEDURE — 59025 FETAL NON-STRESS TEST: CPT

## 2022-09-14 PROCEDURE — 96361 HYDRATE IV INFUSION ADD-ON: CPT

## 2022-09-14 RX ORDER — ACETAMINOPHEN 500 MG
1000 TABLET ORAL EVERY 6 HOURS PRN
Status: DISCONTINUED | OUTPATIENT
Start: 2022-09-14 | End: 2022-09-15

## 2022-09-14 RX ORDER — DOCUSATE SODIUM 100 MG/1
100 CAPSULE, LIQUID FILLED ORAL 2 TIMES DAILY
Status: DISCONTINUED | OUTPATIENT
Start: 2022-09-14 | End: 2022-09-15

## 2022-09-14 RX ORDER — CHOLECALCIFEROL (VITAMIN D3) 25 MCG
1 TABLET,CHEWABLE ORAL DAILY
Status: DISCONTINUED | OUTPATIENT
Start: 2022-09-14 | End: 2022-09-15

## 2022-09-14 RX ORDER — BETAMETHASONE SODIUM PHOSPHATE AND BETAMETHASONE ACETATE 3; 3 MG/ML; MG/ML
INJECTION, SUSPENSION INTRA-ARTICULAR; INTRALESIONAL; INTRAMUSCULAR; SOFT TISSUE
Status: COMPLETED
Start: 2022-09-14 | End: 2022-09-14

## 2022-09-14 NOTE — PROGRESS NOTES
Pt is a 32year old female admitted to 372/372-A. Patient presents with:  R/o Pih: MELVIN and vision changes. BP normal in office. protein in urine >300  Scheduled : 1530 on 9/15/22  Proteinuria     Pt is  35w5d intra-uterine pregnancy. History obtained, consents signed. Oriented to room, staff, and plan of care.          Pt currently transferred to room 372 in ambulatory state in stable conditions

## 2022-09-14 NOTE — CM/SW NOTE
MDO to NABILA for OUD screen. Per chart review, pt was prescribed Norco for an injury to her finger that she sustained while at work. Pt endorses usinf for 2 months and is no longer taking. NABILA/MEGGAN to remain available for support and/or discharge planning.      ALONDRA Tillman, [facility]  Social Work   QVN:#[id number]

## 2022-09-15 VITALS
BODY MASS INDEX: 37.73 KG/M2 | HEART RATE: 93 BPM | WEIGHT: 212.94 LBS | RESPIRATION RATE: 16 BRPM | SYSTOLIC BLOOD PRESSURE: 116 MMHG | TEMPERATURE: 98 F | HEIGHT: 63 IN | OXYGEN SATURATION: 97 % | DIASTOLIC BLOOD PRESSURE: 70 MMHG

## 2022-09-15 PROBLEM — O14.93 PRE-ECLAMPSIA IN THIRD TRIMESTER (HCC): Status: ACTIVE | Noted: 2022-09-13

## 2022-09-15 PROBLEM — O14.93 PRE-ECLAMPSIA IN THIRD TRIMESTER: Status: ACTIVE | Noted: 2022-09-13

## 2022-09-15 LAB
ALBUMIN SERPL-MCNC: 2.5 G/DL (ref 3.4–5)
ALBUMIN/GLOB SERPL: 0.6 {RATIO} (ref 1–2)
ALP LIVER SERPL-CCNC: 131 U/L
ALT SERPL-CCNC: 15 U/L
ANION GAP SERPL CALC-SCNC: 10 MMOL/L (ref 0–18)
AST SERPL-CCNC: 10 U/L (ref 15–37)
BASOPHILS # BLD AUTO: 0.02 X10(3) UL (ref 0–0.2)
BASOPHILS NFR BLD AUTO: 0.2 %
BILIRUB SERPL-MCNC: 0.2 MG/DL (ref 0.1–2)
BUN BLD-MCNC: 11 MG/DL (ref 7–18)
BUN/CREAT SERPL: 16.7 (ref 10–20)
CALCIUM BLD-MCNC: 8.7 MG/DL (ref 8.5–10.1)
CHLORIDE SERPL-SCNC: 107 MMOL/L (ref 98–112)
CO2 SERPL-SCNC: 20 MMOL/L (ref 21–32)
CREAT BLD-MCNC: 0.66 MG/DL
DEPRECATED RDW RBC AUTO: 44.3 FL (ref 35.1–46.3)
EOSINOPHIL # BLD AUTO: 0 X10(3) UL (ref 0–0.7)
EOSINOPHIL NFR BLD AUTO: 0 %
ERYTHROCYTE [DISTWIDTH] IN BLOOD BY AUTOMATED COUNT: 13.6 % (ref 11–15)
GFR SERPLBLD BASED ON 1.73 SQ M-ARVRAT: 120 ML/MIN/1.73M2 (ref 60–?)
GLOBULIN PLAS-MCNC: 4.2 G/DL (ref 2.8–4.4)
GLUCOSE BLD-MCNC: 152 MG/DL (ref 70–99)
GROUP B STREP BY PCR FOR PCR OVT: NEGATIVE
HCT VFR BLD AUTO: 35.4 %
HGB BLD-MCNC: 11.9 G/DL
IMM GRANULOCYTES # BLD AUTO: 0.14 X10(3) UL (ref 0–1)
IMM GRANULOCYTES NFR BLD: 1.3 %
LYMPHOCYTES # BLD AUTO: 2.06 X10(3) UL (ref 1–4)
LYMPHOCYTES NFR BLD AUTO: 19.2 %
MCH RBC QN AUTO: 30 PG (ref 26–34)
MCHC RBC AUTO-ENTMCNC: 33.6 G/DL (ref 31–37)
MCV RBC AUTO: 89.2 FL
MONOCYTES # BLD AUTO: 0.44 X10(3) UL (ref 0.1–1)
MONOCYTES NFR BLD AUTO: 4.1 %
NEUTROPHILS # BLD AUTO: 8.06 X10 (3) UL (ref 1.5–7.7)
NEUTROPHILS # BLD AUTO: 8.06 X10(3) UL (ref 1.5–7.7)
NEUTROPHILS NFR BLD AUTO: 75.2 %
OSMOLALITY SERPL CALC.SUM OF ELEC: 286 MOSM/KG (ref 275–295)
PLATELET # BLD AUTO: 224 10(3)UL (ref 150–450)
POTASSIUM SERPL-SCNC: 3.9 MMOL/L (ref 3.5–5.1)
PROT SERPL-MCNC: 6.7 G/DL (ref 6.4–8.2)
RBC # BLD AUTO: 3.97 X10(6)UL
SODIUM SERPL-SCNC: 137 MMOL/L (ref 136–145)
WBC # BLD AUTO: 10.7 X10(3) UL (ref 4–11)

## 2022-09-15 PROCEDURE — 85025 COMPLETE CBC W/AUTO DIFF WBC: CPT | Performed by: OBSTETRICS & GYNECOLOGY

## 2022-09-15 PROCEDURE — 59025 FETAL NON-STRESS TEST: CPT

## 2022-09-15 PROCEDURE — 80053 COMPREHEN METABOLIC PANEL: CPT | Performed by: OBSTETRICS & GYNECOLOGY

## 2022-09-15 NOTE — PLAN OF CARE
Problem: Patient Centered Care  Goal: Patient preferences are identified and integrated in the patient's plan of care  Description: Interventions:  - What would you like us to know as we care for you? Having a baby boy. Two boys at home.   - Provide timely, complete, and accurate information to patient/family  - Incorporate patient and family knowledge, values, beliefs, and cultural backgrounds into the planning and delivery of care  - Encourage patient/family to participate in care and decision-making at the level they choose  - Honor patient and family perspectives and choices  2022 by Christopher Wahl RN  Outcome: Progressing  2022 by Christopher Wahl RN  Outcome: Progressing     Problem: BIRTH - VAGINAL/ SECTION  Goal: Fetal and maternal status remain reassuring during the birth process  Description: INTERVENTIONS:  - Monitor vital signs  - Monitor fetal heart rate  - Monitor uterine activity  - Monitor labor progression (vaginal delivery)  - DVT prophylaxis (C/S delivery)  - Surgical antibiotic prophylaxis (C/S delivery)  Outcome: Progressing     Problem: PAIN - ADULT  Goal: Verbalizes/displays adequate comfort level or patient's stated pain goal  Description: INTERVENTIONS:  - Encourage pt to monitor pain and request assistance  - Assess pain using appropriate pain scale  - Administer analgesics based on type and severity of pain and evaluate response  - Implement non-pharmacological measures as appropriate and evaluate response  - Consider cultural and social influences on pain and pain management  - Manage/alleviate anxiety  - Utilize distraction and/or relaxation techniques  - Monitor for opioid side effects  - Notify MD/LIP if interventions unsuccessful or patient reports new pain  - Anticipate increased pain with activity and pre-medicate as appropriate  Outcome: Progressing     Problem: ANXIETY  Goal: Will report anxiety at manageable levels  Description: INTERVENTIONS:  - Administer medication as ordered  - Teach and rehearse alternative coping skills  - Provide emotional support with 1:1 interaction with staff  Outcome: Progressing

## 2022-09-15 NOTE — PROGRESS NOTES
MD spoke to patient. Patient is being discharged home and has a scheduled NST with IKER liangw, 9/16/2022. Pt educated on pre-e s/s and verbalizes understanding. All pt questions answered.

## 2022-09-16 ENCOUNTER — HOSPITAL ENCOUNTER (OUTPATIENT)
Dept: PERINATAL CARE | Facility: HOSPITAL | Age: 32
Discharge: HOME | End: 2022-09-16
Attending: OBSTETRICS & GYNECOLOGY
Payer: COMMERCIAL

## 2022-09-16 DIAGNOSIS — E66.9 OBESITY: Primary | ICD-10-CM

## 2022-09-16 DIAGNOSIS — O14.93 PRE-ECLAMPSIA IN THIRD TRIMESTER: ICD-10-CM

## 2022-09-16 PROCEDURE — 59025 FETAL NON-STRESS TEST: CPT

## 2022-09-16 NOTE — ADDENDUM NOTE
Encounter addended by: Karly Esquivel RN on: 9/16/2022 12:40 PM   Actions taken: Visit diagnoses modified, Order list changed, Diagnosis association updated, Flowsheet accepted

## 2022-09-19 ENCOUNTER — HOSPITAL ENCOUNTER (OUTPATIENT)
Facility: HOSPITAL | Age: 32
Discharge: HOME | End: 2022-09-19
Attending: OBSTETRICS & GYNECOLOGY | Admitting: OBSTETRICS & GYNECOLOGY

## 2022-09-19 ENCOUNTER — APPOINTMENT (OUTPATIENT)
Dept: OBGYN CLINIC | Facility: HOSPITAL | Age: 32
End: 2022-09-19

## 2022-09-19 ENCOUNTER — TELEPHONE (OUTPATIENT)
Dept: OBGYN CLINIC | Facility: CLINIC | Age: 32
End: 2022-09-19

## 2022-09-19 VITALS — DIASTOLIC BLOOD PRESSURE: 80 MMHG | SYSTOLIC BLOOD PRESSURE: 123 MMHG | HEART RATE: 92 BPM | RESPIRATION RATE: 18 BRPM

## 2022-09-19 PROCEDURE — 99213 OFFICE O/P EST LOW 20 MIN: CPT

## 2022-09-19 PROCEDURE — 59025 FETAL NON-STRESS TEST: CPT

## 2022-09-19 NOTE — PROGRESS NOTES
Pt is a 32year old female admitted to TR1/TR1-A. Patient presents with:  Non-stress Test: preeclampsia     Pt is  36w4d intra-uterine pregnancy. History obtained, consents signed. Oriented to room, staff, and plan of care.

## 2022-09-19 NOTE — TELEPHONE ENCOUNTER
Please contact patient and schedule her for a prenatal visit with me on 9/19/2022 at 12:50 PM or add her to the schedule for 9/20/2022.

## 2022-09-19 NOTE — TELEPHONE ENCOUNTER
Pt Name and  verified. Patient informed and verbalized understanding.  Pt scheduled with BASSAM tomorrow at 9 am

## 2022-09-19 NOTE — TRIAGE
[facility] HOSP - [facility]      Triage Note    Luisontie 6 Patient Status:  Outpatient    10/9/1990 MRN [id number]   Location [address] Attending Tommy Schafer MD   Hosp Day # 0 PCP MD Alan Kellogg Books: [id number]  Estimated Date of Delivery: 10/13/22  Gestation: 36w4d    Chief Complaint     Non-stress Test          Allergies:  No Known Allergies    No orders of the defined types were placed in this encounter.       Lab Results   Component Value Date    WBC 10.7 09/15/2022    HGB 11.9 (L) 09/15/2022    HCT 35.4 09/15/2022    .0 09/15/2022    CREATSERUM 0.66 09/15/2022    BUN 11 09/15/2022     09/15/2022    K 3.9 09/15/2022     09/15/2022    CO2 20.0 (L) 09/15/2022     (H) 09/15/2022    CA 8.7 09/15/2022    ALB 2.5 (L) 09/15/2022    ALKPHO 131 (H) 09/15/2022    BILT 0.2 09/15/2022    TP 6.7 09/15/2022    AST 10 (L) 09/15/2022    ALT 15 09/15/2022    T4F 0.9 06/10/2020    TSH 2.300 2022    MIRA 48 2018    LIP 79 2018    DDIMER 0.53 (H) 07/10/2022    ESRML 26 (H) 2018       Clinitek UA  Lab Results   Component Value Date    GLUUR Negative 2019    SPECGRAVITY 1.005 2022    URINECUL <10,000 CFU/ML Gram negative edilia 2022       UA  Lab Results   Component Value Date    COLORUR Colorless (A) 2019    CLARITY Clear 2019    SPECGRAVITY 1.005 2022    PROUR Negative 2019    GLUUR Negative 2019    KETUR Negative 2019    BILUR Negative 2019    BLOODURINE Negative 2019    NITRITE Negative 2022    UROBILINOGEN <2.0 2019    LEUUR Negative 2019    UASA Negative 2019  1630   BP: 123/80   BP Location: Left arm   Pulse: 92   Resp: 18       NST  Variability: Moderate           Accelerations: Yes           Decelerations: None            Baseline: 145 BPM           Uterine Irritability: No           Contractions: Irregular                                        Acoustic Stimulator: No           Nonstress Test Interpretation: Reactive           Nonstress Test Second Interpretation: Reactive          FHR Category: Category I             Additional Comments Comments: Bedside ELVIS 7.7; pt to have repeat  on Thursday this week.      Patient presents with:  Non-stress Test: preeclampsia        Elissa Collins RN  2022 6:18 PM    Patient seen and ultrasound done   Vtx  Grade 3 Placenta  ELVIS 7.7  RNST     Being seen in office Tomorrow with Dr Michele Kohli MD

## 2022-09-20 ENCOUNTER — TELEPHONE (OUTPATIENT)
Dept: OBGYN CLINIC | Facility: CLINIC | Age: 32
End: 2022-09-20

## 2022-09-20 ENCOUNTER — LAB ENCOUNTER (OUTPATIENT)
Dept: LAB | Age: 32
End: 2022-09-20
Attending: OBSTETRICS & GYNECOLOGY

## 2022-09-20 ENCOUNTER — ROUTINE PRENATAL (OUTPATIENT)
Dept: OBGYN CLINIC | Facility: CLINIC | Age: 32
End: 2022-09-20

## 2022-09-20 VITALS — WEIGHT: 212 LBS | DIASTOLIC BLOOD PRESSURE: 84 MMHG | SYSTOLIC BLOOD PRESSURE: 124 MMHG | BODY MASS INDEX: 38 KG/M2

## 2022-09-20 DIAGNOSIS — Z34.83 ENCOUNTER FOR SUPERVISION OF OTHER NORMAL PREGNANCY IN THIRD TRIMESTER: ICD-10-CM

## 2022-09-20 DIAGNOSIS — Z34.83 ENCOUNTER FOR SUPERVISION OF OTHER NORMAL PREGNANCY IN THIRD TRIMESTER: Primary | ICD-10-CM

## 2022-09-20 LAB
ANTIBODY SCREEN: NEGATIVE
BASOPHILS # BLD AUTO: 0.04 X10(3) UL (ref 0–0.2)
BASOPHILS NFR BLD AUTO: 0.4 %
BILIRUBIN: NEGATIVE
DEPRECATED RDW RBC AUTO: 44 FL (ref 35.1–46.3)
EOSINOPHIL # BLD AUTO: 0.11 X10(3) UL (ref 0–0.7)
EOSINOPHIL NFR BLD AUTO: 1.2 %
ERYTHROCYTE [DISTWIDTH] IN BLOOD BY AUTOMATED COUNT: 13.5 % (ref 11–15)
GLUCOSE (URINE DIPSTICK): NEGATIVE MG/DL
HCT VFR BLD AUTO: 35.9 %
HGB BLD-MCNC: 12.2 G/DL
IMM GRANULOCYTES # BLD AUTO: 0.06 X10(3) UL (ref 0–1)
IMM GRANULOCYTES NFR BLD: 0.6 %
KETONES (URINE DIPSTICK): NEGATIVE MG/DL
LYMPHOCYTES # BLD AUTO: 3 X10(3) UL (ref 1–4)
LYMPHOCYTES NFR BLD AUTO: 31.6 %
MCH RBC QN AUTO: 30.2 PG (ref 26–34)
MCHC RBC AUTO-ENTMCNC: 34 G/DL (ref 31–37)
MCV RBC AUTO: 88.9 FL
MONOCYTES # BLD AUTO: 0.41 X10(3) UL (ref 0.1–1)
MONOCYTES NFR BLD AUTO: 4.3 %
MULTISTIX LOT#: ABNORMAL NUMERIC
NEUTROPHILS # BLD AUTO: 5.87 X10 (3) UL (ref 1.5–7.7)
NEUTROPHILS # BLD AUTO: 5.87 X10(3) UL (ref 1.5–7.7)
NEUTROPHILS NFR BLD AUTO: 61.9 %
NITRITE, URINE: NEGATIVE
OCCULT BLOOD: NEGATIVE
PH, URINE: 7.5 (ref 4.5–8)
PLATELET # BLD AUTO: 229 10(3)UL (ref 150–450)
RBC # BLD AUTO: 4.04 X10(6)UL
RH BLOOD TYPE: POSITIVE
SPECIFIC GRAVITY: 1 (ref 1–1.03)
URINE-COLOR: YELLOW
UROBILINOGEN,SEMI-QN: 0.2 MG/DL (ref 0–1.9)
WBC # BLD AUTO: 9.5 X10(3) UL (ref 4–11)

## 2022-09-20 PROCEDURE — 81002 URINALYSIS NONAUTO W/O SCOPE: CPT | Performed by: OBSTETRICS & GYNECOLOGY

## 2022-09-20 PROCEDURE — 3074F SYST BP LT 130 MM HG: CPT | Performed by: OBSTETRICS & GYNECOLOGY

## 2022-09-20 PROCEDURE — 36415 COLL VENOUS BLD VENIPUNCTURE: CPT | Performed by: OBSTETRICS & GYNECOLOGY

## 2022-09-20 PROCEDURE — 85025 COMPLETE CBC W/AUTO DIFF WBC: CPT

## 2022-09-20 PROCEDURE — 3079F DIAST BP 80-89 MM HG: CPT | Performed by: OBSTETRICS & GYNECOLOGY

## 2022-09-20 PROCEDURE — 86850 RBC ANTIBODY SCREEN: CPT | Performed by: OBSTETRICS & GYNECOLOGY

## 2022-09-20 PROCEDURE — 86901 BLOOD TYPING SEROLOGIC RH(D): CPT | Performed by: OBSTETRICS & GYNECOLOGY

## 2022-09-20 PROCEDURE — 86900 BLOOD TYPING SEROLOGIC ABO: CPT | Performed by: OBSTETRICS & GYNECOLOGY

## 2022-09-20 NOTE — TELEPHONE ENCOUNTER
Orders placed for CBC, Type&Screen, and Covid test. Pt to go to lab after appt in [place]. C/section scheduled for 09/22/2022 @ 6615. msg sent to [facility] to confirm.  Assist pending

## 2022-09-21 LAB — SARS-COV-2 RNA RESP QL NAA+PROBE: NOT DETECTED

## 2022-09-21 NOTE — H&P
[facility]    PATIENT'S NAME: RAVINDER Patterson   ATTENDING PHYSICIAN: Rui Gamble MD   PATIENT ACCOUNT#:   [id number]    LOCATION:    MEDICAL RECORD #:   [id number]       YOB: 1990  ADMISSION DATE:       2022    HISTORY AND PHYSICAL EXAMINATION    HISTORY OF PRESENT ILLNESS:  The patient is a 19-year-old female,  3, para 2-0-0-2, who is scheduled for repeat  section at 37 weeks on Thursday, 2022. The patient has a history of 2 previous  sections. She will be 37 weeks, and the reason for  section at this time is due to preeclampsia without severe features. The patient has been taking home blood pressures, and they have not been in the severe range. The risks of surgery have been reviewed with the patient including, but not limited to, injury to the bowel, bladder, major blood vessels, injury to other organs, risk of blood transfusion, DVT, pulmonary embolism, reoperation, as well as the risk of anesthesia. The patient expresses understanding and desires to proceed on Thursday for her repeat  section. PAST MEDICAL HISTORY:  Anemia. PAST SURGICAL HISTORY:  Cholecystectomy and 2 previous  sections. MEDICATIONS:  Prenatal vitamin. ALLERGIES:  No known drug allergies. FAMILY HISTORY:  Significant for diabetes and hypertension. SOCIAL HISTORY:  Patient is a nonsmoker. No alcohol use and no illicit drug use. REVIEW OF SYSTEMS:  A 12-point review of systems is unremarkable. PHYSICAL EXAMINATION:    GENERAL:  The patient is a well-nourished female in no acute distress. VITAL SIGNS:  Blood pressure 124/84, pulse 92, respirations 16, and weight 212 pounds. NECK:  Supple without thyromegaly. LUNGS:  Clear to auscultation. HEART:  Regular rate and rhythm with normal S1, S2.  BREASTS:  No masses. No adenopathy. No nipple discharge. ABDOMEN:  Gravid. No hepatosplenomegaly.    EXTREMITIES: No edema. No calf tenderness. ASSESSMENT:  Patient is 37 weeks with 2 previous  sections and is scheduled for repeat  section. PLAN:  Patient has a repeat  section scheduled for . She is to be n.p.o. after midnight, and she will go for her preoperative and COVID testing prior to the day of surgery. Dictated By Silva Batista MD  d: 2022 15:27:57  t: 2022 16:41:57  [facility] [id number]  ML/

## 2022-09-22 ENCOUNTER — ANESTHESIA (OUTPATIENT)
Dept: OBGYN UNIT | Facility: HOSPITAL | Age: 32
End: 2022-09-22
Payer: COMMERCIAL

## 2022-09-22 ENCOUNTER — HOSPITAL ENCOUNTER (INPATIENT)
Facility: HOSPITAL | Age: 32
LOS: 3 days | Discharge: HOME | End: 2022-09-25
Attending: OBSTETRICS & GYNECOLOGY | Admitting: OBSTETRICS & GYNECOLOGY
Payer: COMMERCIAL

## 2022-09-22 ENCOUNTER — ANESTHESIA EVENT (OUTPATIENT)
Dept: OBGYN UNIT | Facility: HOSPITAL | Age: 32
End: 2022-09-22
Payer: COMMERCIAL

## 2022-09-22 PROBLEM — O34.219 PREVIOUS CESAREAN SECTION COMPLICATING PREGNANCY, ANTEPARTUM CONDITION OR COMPLICATION: Status: ACTIVE | Noted: 2022-04-08

## 2022-09-22 PROBLEM — O34.219 PREVIOUS CESAREAN SECTION COMPLICATING PREGNANCY, ANTEPARTUM CONDITION OR COMPLICATION (HCC): Status: ACTIVE | Noted: 2022-04-08

## 2022-09-22 LAB
BASOPHILS # BLD AUTO: 0.03 X10(3) UL (ref 0–0.2)
BASOPHILS NFR BLD AUTO: 0.2 %
DEPRECATED RDW RBC AUTO: 43.3 FL (ref 35.1–46.3)
EOSINOPHIL # BLD AUTO: 0.01 X10(3) UL (ref 0–0.7)
EOSINOPHIL NFR BLD AUTO: 0.1 %
ERYTHROCYTE [DISTWIDTH] IN BLOOD BY AUTOMATED COUNT: 13.4 % (ref 11–15)
HCT VFR BLD AUTO: 38.6 %
HGB BLD-MCNC: 13.3 G/DL
IMM GRANULOCYTES # BLD AUTO: 0.06 X10(3) UL (ref 0–1)
IMM GRANULOCYTES NFR BLD: 0.4 %
LYMPHOCYTES # BLD AUTO: 1.66 X10(3) UL (ref 1–4)
LYMPHOCYTES NFR BLD AUTO: 12.1 %
MCH RBC QN AUTO: 30.6 PG (ref 26–34)
MCHC RBC AUTO-ENTMCNC: 34.5 G/DL (ref 31–37)
MCV RBC AUTO: 88.7 FL
MONOCYTES # BLD AUTO: 0.29 X10(3) UL (ref 0.1–1)
MONOCYTES NFR BLD AUTO: 2.1 %
NEUTROPHILS # BLD AUTO: 11.7 X10 (3) UL (ref 1.5–7.7)
NEUTROPHILS # BLD AUTO: 11.7 X10(3) UL (ref 1.5–7.7)
NEUTROPHILS NFR BLD AUTO: 85.1 %
PLATELET # BLD AUTO: 237 10(3)UL (ref 150–450)
RBC # BLD AUTO: 4.35 X10(6)UL
WBC # BLD AUTO: 13.8 X10(3) UL (ref 4–11)

## 2022-09-22 PROCEDURE — 59510 CESAREAN DELIVERY: CPT | Performed by: OBSTETRICS & GYNECOLOGY

## 2022-09-22 PROCEDURE — 59514 CESAREAN DELIVERY ONLY: CPT | Performed by: OBSTETRICS & GYNECOLOGY

## 2022-09-22 RX ORDER — AMMONIA INHALANTS 0.04 G/.3ML
0.3 INHALANT RESPIRATORY (INHALATION) AS NEEDED
Status: DISCONTINUED | OUTPATIENT
Start: 2022-09-22 | End: 2022-09-25

## 2022-09-22 RX ORDER — SIMETHICONE 80 MG
80 TABLET,CHEWABLE ORAL 3 TIMES DAILY PRN
Status: DISCONTINUED | OUTPATIENT
Start: 2022-09-22 | End: 2022-09-25

## 2022-09-22 RX ORDER — ONDANSETRON 2 MG/ML
4 INJECTION INTRAMUSCULAR; INTRAVENOUS ONCE AS NEEDED
Status: ACTIVE | OUTPATIENT
Start: 2022-09-22 | End: 2022-09-22

## 2022-09-22 RX ORDER — MORPHINE SULFATE 1 MG/ML
INJECTION, SOLUTION EPIDURAL; INTRATHECAL; INTRAVENOUS AS NEEDED
Status: DISCONTINUED | OUTPATIENT
Start: 2022-09-22 | End: 2022-09-22 | Stop reason: SURG

## 2022-09-22 RX ORDER — NALBUPHINE HCL 10 MG/ML
2.5 AMPUL (ML) INJECTION
Status: DISCONTINUED | OUTPATIENT
Start: 2022-09-22 | End: 2022-09-25

## 2022-09-22 RX ORDER — HYDROMORPHONE HYDROCHLORIDE 1 MG/ML
0.4 INJECTION, SOLUTION INTRAMUSCULAR; INTRAVENOUS; SUBCUTANEOUS
Status: DISPENSED | OUTPATIENT
Start: 2022-09-22 | End: 2022-09-23

## 2022-09-22 RX ORDER — BUPIVACAINE HYDROCHLORIDE 7.5 MG/ML
INJECTION, SOLUTION INTRASPINAL AS NEEDED
Status: DISCONTINUED | OUTPATIENT
Start: 2022-09-22 | End: 2022-09-22 | Stop reason: SURG

## 2022-09-22 RX ORDER — ACETAMINOPHEN 500 MG
1000 TABLET ORAL ONCE
Status: COMPLETED | OUTPATIENT
Start: 2022-09-22 | End: 2022-09-22

## 2022-09-22 RX ORDER — DIPHENHYDRAMINE HYDROCHLORIDE 50 MG/ML
25 INJECTION INTRAMUSCULAR; INTRAVENOUS ONCE AS NEEDED
OUTPATIENT
Start: 2022-09-22 | End: 2022-09-22

## 2022-09-22 RX ORDER — SODIUM CHLORIDE, SODIUM LACTATE, POTASSIUM CHLORIDE, CALCIUM CHLORIDE 600; 310; 30; 20 MG/100ML; MG/100ML; MG/100ML; MG/100ML
INJECTION, SOLUTION INTRAVENOUS CONTINUOUS
OUTPATIENT
Start: 2022-09-22

## 2022-09-22 RX ORDER — KETOROLAC TROMETHAMINE 30 MG/ML
30 INJECTION, SOLUTION INTRAMUSCULAR; INTRAVENOUS EVERY 6 HOURS
Status: COMPLETED | OUTPATIENT
Start: 2022-09-22 | End: 2022-09-23

## 2022-09-22 RX ORDER — DOCUSATE SODIUM 100 MG/1
100 CAPSULE, LIQUID FILLED ORAL
Status: DISCONTINUED | OUTPATIENT
Start: 2022-09-22 | End: 2022-09-25

## 2022-09-22 RX ORDER — METOCLOPRAMIDE HYDROCHLORIDE 5 MG/ML
10 INJECTION INTRAMUSCULAR; INTRAVENOUS ONCE
Status: COMPLETED | OUTPATIENT
Start: 2022-09-22 | End: 2022-09-22

## 2022-09-22 RX ORDER — ENOXAPARIN SODIUM 100 MG/ML
40 INJECTION SUBCUTANEOUS DAILY
Status: DISCONTINUED | OUTPATIENT
Start: 2022-09-22 | End: 2022-09-25

## 2022-09-22 RX ORDER — TRISODIUM CITRATE DIHYDRATE AND CITRIC ACID MONOHYDRATE 500; 334 MG/5ML; MG/5ML
30 SOLUTION ORAL ONCE
Status: COMPLETED | OUTPATIENT
Start: 2022-09-22 | End: 2022-09-22

## 2022-09-22 RX ORDER — LIDOCAINE HYDROCHLORIDE 10 MG/ML
INJECTION, SOLUTION EPIDURAL; INFILTRATION; INTRACAUDAL; PERINEURAL AS NEEDED
Status: DISCONTINUED | OUTPATIENT
Start: 2022-09-22 | End: 2022-09-22 | Stop reason: SURG

## 2022-09-22 RX ORDER — PROCHLORPERAZINE EDISYLATE 5 MG/ML
5 INJECTION INTRAMUSCULAR; INTRAVENOUS ONCE AS NEEDED
Status: ACTIVE | OUTPATIENT
Start: 2022-09-22 | End: 2022-09-22

## 2022-09-22 RX ORDER — ACETAMINOPHEN 325 MG/1
650 TABLET ORAL EVERY 6 HOURS PRN
Status: ACTIVE | OUTPATIENT
Start: 2022-09-22 | End: 2022-09-23

## 2022-09-22 RX ORDER — METOCLOPRAMIDE 10 MG/1
10 TABLET ORAL ONCE
Status: COMPLETED | OUTPATIENT
Start: 2022-09-22 | End: 2022-09-22

## 2022-09-22 RX ORDER — HYDROMORPHONE HYDROCHLORIDE 1 MG/ML
0.6 INJECTION, SOLUTION INTRAMUSCULAR; INTRAVENOUS; SUBCUTANEOUS
Status: ACTIVE | OUTPATIENT
Start: 2022-09-22 | End: 2022-09-23

## 2022-09-22 RX ORDER — DIPHENHYDRAMINE HYDROCHLORIDE 50 MG/ML
12.5 INJECTION INTRAMUSCULAR; INTRAVENOUS EVERY 4 HOURS PRN
Status: DISCONTINUED | OUTPATIENT
Start: 2022-09-22 | End: 2022-09-23

## 2022-09-22 RX ORDER — DEXAMETHASONE SODIUM PHOSPHATE 4 MG/ML
VIAL (ML) INJECTION AS NEEDED
Status: DISCONTINUED | OUTPATIENT
Start: 2022-09-22 | End: 2022-09-22 | Stop reason: SURG

## 2022-09-22 RX ORDER — IBUPROFEN 600 MG/1
600 TABLET ORAL EVERY 6 HOURS
Status: DISCONTINUED | OUTPATIENT
Start: 2022-09-23 | End: 2022-09-25

## 2022-09-22 RX ORDER — SODIUM CHLORIDE, SODIUM LACTATE, POTASSIUM CHLORIDE, CALCIUM CHLORIDE 600; 310; 30; 20 MG/100ML; MG/100ML; MG/100ML; MG/100ML
125 INJECTION, SOLUTION INTRAVENOUS CONTINUOUS
Status: DISCONTINUED | OUTPATIENT
Start: 2022-09-22 | End: 2022-09-22 | Stop reason: HOSPADM

## 2022-09-22 RX ORDER — ONDANSETRON 2 MG/ML
4 INJECTION INTRAMUSCULAR; INTRAVENOUS EVERY 6 HOURS PRN
Status: DISCONTINUED | OUTPATIENT
Start: 2022-09-22 | End: 2022-09-25

## 2022-09-22 RX ORDER — KETOROLAC TROMETHAMINE 30 MG/ML
30 INJECTION, SOLUTION INTRAMUSCULAR; INTRAVENOUS ONCE AS NEEDED
OUTPATIENT
Start: 2022-09-22 | End: 2022-09-22

## 2022-09-22 RX ORDER — HALOPERIDOL 5 MG/ML
0.5 INJECTION INTRAMUSCULAR ONCE AS NEEDED
Status: ACTIVE | OUTPATIENT
Start: 2022-09-22 | End: 2022-09-22

## 2022-09-22 RX ORDER — ONDANSETRON 2 MG/ML
4 INJECTION INTRAMUSCULAR; INTRAVENOUS ONCE AS NEEDED
OUTPATIENT
Start: 2022-09-22 | End: 2022-09-22

## 2022-09-22 RX ORDER — HYDROCODONE BITARTRATE AND ACETAMINOPHEN 7.5; 325 MG/1; MG/1
2 TABLET ORAL EVERY 6 HOURS PRN
Status: ACTIVE | OUTPATIENT
Start: 2022-09-22 | End: 2022-09-23

## 2022-09-22 RX ORDER — GABAPENTIN 300 MG/1
300 CAPSULE ORAL EVERY 8 HOURS PRN
Status: DISCONTINUED | OUTPATIENT
Start: 2022-09-22 | End: 2022-09-25

## 2022-09-22 RX ORDER — NALBUPHINE HCL 10 MG/ML
2.5 AMPUL (ML) INJECTION EVERY 4 HOURS PRN
Status: ACTIVE | OUTPATIENT
Start: 2022-09-22 | End: 2022-09-23

## 2022-09-22 RX ORDER — DEXTROSE, SODIUM CHLORIDE, SODIUM LACTATE, POTASSIUM CHLORIDE, AND CALCIUM CHLORIDE 5; .6; .31; .03; .02 G/100ML; G/100ML; G/100ML; G/100ML; G/100ML
INJECTION, SOLUTION INTRAVENOUS CONTINUOUS
Status: DISCONTINUED | OUTPATIENT
Start: 2022-09-22 | End: 2022-09-25

## 2022-09-22 RX ORDER — HYDROCODONE BITARTRATE AND ACETAMINOPHEN 7.5; 325 MG/1; MG/1
1 TABLET ORAL EVERY 6 HOURS PRN
Status: ACTIVE | OUTPATIENT
Start: 2022-09-22 | End: 2022-09-23

## 2022-09-22 RX ORDER — CEFAZOLIN SODIUM/WATER 2 G/20 ML
2 SYRINGE (ML) INTRAVENOUS ONCE
Status: COMPLETED | OUTPATIENT
Start: 2022-09-22 | End: 2022-09-22

## 2022-09-22 RX ORDER — DIPHENHYDRAMINE HCL 25 MG
25 CAPSULE ORAL EVERY 4 HOURS PRN
Status: DISCONTINUED | OUTPATIENT
Start: 2022-09-22 | End: 2022-09-23

## 2022-09-22 RX ORDER — ONDANSETRON 2 MG/ML
INJECTION INTRAMUSCULAR; INTRAVENOUS AS NEEDED
Status: DISCONTINUED | OUTPATIENT
Start: 2022-09-22 | End: 2022-09-22 | Stop reason: SURG

## 2022-09-22 RX ORDER — CHOLECALCIFEROL (VITAMIN D3) 25 MCG
1 TABLET,CHEWABLE ORAL DAILY
Status: DISCONTINUED | OUTPATIENT
Start: 2022-09-22 | End: 2022-09-25

## 2022-09-22 RX ORDER — NALOXONE HYDROCHLORIDE 0.4 MG/ML
0.08 INJECTION, SOLUTION INTRAMUSCULAR; INTRAVENOUS; SUBCUTANEOUS
Status: ACTIVE | OUTPATIENT
Start: 2022-09-22 | End: 2022-09-23

## 2022-09-22 RX ORDER — ONDANSETRON 2 MG/ML
4 INJECTION INTRAMUSCULAR; INTRAVENOUS EVERY 6 HOURS PRN
Status: DISCONTINUED | OUTPATIENT
Start: 2022-09-22 | End: 2022-09-22 | Stop reason: HOSPADM

## 2022-09-22 RX ORDER — ACETAMINOPHEN 500 MG
1000 TABLET ORAL EVERY 6 HOURS
Status: DISCONTINUED | OUTPATIENT
Start: 2022-09-22 | End: 2022-09-25

## 2022-09-22 RX ORDER — BISACODYL 10 MG
10 SUPPOSITORY, RECTAL RECTAL ONCE AS NEEDED
Status: DISCONTINUED | OUTPATIENT
Start: 2022-09-22 | End: 2022-09-25

## 2022-09-22 RX ORDER — FAMOTIDINE 20 MG/1
20 TABLET, FILM COATED ORAL ONCE
Status: COMPLETED | OUTPATIENT
Start: 2022-09-22 | End: 2022-09-22

## 2022-09-22 RX ORDER — FAMOTIDINE 10 MG/ML
20 INJECTION, SOLUTION INTRAVENOUS ONCE
Status: COMPLETED | OUTPATIENT
Start: 2022-09-22 | End: 2022-09-22

## 2022-09-22 RX ORDER — FAMOTIDINE 10 MG/ML
INJECTION, SOLUTION INTRAVENOUS
Status: COMPLETED
Start: 2022-09-22 | End: 2022-09-22

## 2022-09-22 RX ADMIN — DEXAMETHASONE SODIUM PHOSPHATE 8 MG: 4 MG/ML VIAL (ML) INJECTION at 08:14:00

## 2022-09-22 RX ADMIN — SODIUM CHLORIDE, SODIUM LACTATE, POTASSIUM CHLORIDE, CALCIUM CHLORIDE: 600; 310; 30; 20 INJECTION, SOLUTION INTRAVENOUS at 08:31:00

## 2022-09-22 RX ADMIN — LIDOCAINE HYDROCHLORIDE 3 ML: 10 INJECTION, SOLUTION EPIDURAL; INFILTRATION; INTRACAUDAL; PERINEURAL at 08:07:00

## 2022-09-22 RX ADMIN — SODIUM CHLORIDE, SODIUM LACTATE, POTASSIUM CHLORIDE, CALCIUM CHLORIDE: 600; 310; 30; 20 INJECTION, SOLUTION INTRAVENOUS at 09:00:00

## 2022-09-22 RX ADMIN — MORPHINE SULFATE 0.2 MG: 1 INJECTION, SOLUTION EPIDURAL; INTRATHECAL; INTRAVENOUS at 08:10:00

## 2022-09-22 RX ADMIN — CEFAZOLIN SODIUM/WATER 2 G: 2 G/20 ML SYRINGE (ML) INTRAVENOUS at 08:14:00

## 2022-09-22 RX ADMIN — ONDANSETRON 4 MG: 2 INJECTION INTRAMUSCULAR; INTRAVENOUS at 08:14:00

## 2022-09-22 RX ADMIN — BUPIVACAINE HYDROCHLORIDE 1.5 ML: 7.5 INJECTION, SOLUTION INTRASPINAL at 08:10:00

## 2022-09-22 NOTE — ANESTHESIA PROCEDURE NOTES
Spinal Block  Performed by: Benjie Castellanos MD  Authorized by: Benjie Castellanos MD       General Information and Staff    Start Time:  9/22/2022 8:07 AM  End Time:  9/22/2022 8:10 AM  Anesthesiologist:  Benjie Castellanos MD  Performed by:   Anesthesiologist  Patient Location:  OB  Site identification: surface landmarks    Preanesthetic Checklist: patient identified, IV checked, risks and benefits discussed, monitors and equipment checked, pre-op evaluation, timeout performed, anesthesia consent and sterile technique used      Procedure Details    Patient Position:  Sitting  Prep: ChloraPrep    Monitoring:  Cardiac monitor  Approach:  Midline  Location:  L3-4  Injection Technique:  Single-shot    Needle    Needle Type:  Pencil-tip  Needle Gauge:  24 G  Needle Length:  3.5 in    Assessment    Sensory Level:  T4  Events: clear CSF, CSF aspirated, well tolerated and blood negative      Additional Comments

## 2022-09-22 NOTE — OPERATIVE REPORT
[facility]    PATIENT'S NAME: RAVINDER Sung   ATTENDING PHYSICIAN: Rui Casper MD   OPERATING PHYSICIAN: Juan Antonio Casper MD   PATIENT ACCOUNT#:   [id number]    LOCATION:  [address] [place] [address] #:   [id number]       YOB: 1990  ADMISSION DATE:       2022      OPERATION DATE:  2022    OPERATIVE REPORT      PREOPERATIVE DIAGNOSIS:  Intrauterine pregnancy at 37 weeks, repeat  section for mild preeclampsia. POSTOPERATIVE DIAGNOSIS:  Intrauterine pregnancy at 37 weeks, repeat  section for mild preeclampsia. PROCEDURE:  Low transverse  section through Pfannenstiel skin incision. ASSISTANT SURGEON:  Luli Shah MD.    ANESTHESIA:  Spinal.       PATHOLOGY:  Placenta and cord. QUANTITATIVE BLOOD LOSS:  Pending. INTRAVENOUS FLUIDS:  2000 mL. URINE OUTPUT:  10 mL and clear. Patient voided just prior to surgery. COUNTS:  Correct x2. COMPLICATIONS:  None. CONDITION:  Stable. FINDINGS:  Viable male infant, Apgars 9 at one minute, 9 at five minutes, weight 7 pounds 11 ounces. Normal uterus, fallopian tubes, and ovaries. OPERATIVE TECHNIQUE:  Patient was brought into the operating room. Once the spinal was placed, the patient was placed in the supine position, Bass was inserted, and prepped and draped in the standard sterile fashion. After testing, a Pfannenstiel incision was made through the previous  scar, carried down to the fascia. The fascia was incised and carried laterally with Valadez scissors. The fascia was bluntly and sharply dissected off the rectus muscles. The rectus muscles were bluntly . Peritoneum was entered with Metzenbaum scissors and carried upwardly and downwardly. A bladder blade was placed. A bladder flap was created. A low transverse incision was made, carried laterally bluntly.   The head was immediately delivered, mouth and nose were bulb suctioned, then the rest of the infant was immediately delivered. Cord clamping was delayed for 30 seconds, then the cord was doubly clamped and cut and handed off to the awaiting neonatologist.  Peola Gottron blood was collected. The placenta delivered spontaneously. The uterus was exteriorized and cleaned multiple times with wet laps. A ring forceps was used to open the cervix. Ring forceps were used to grasp the angle of the hysterotomy site. The hysterotomy site was closed with 0 chromic in a running locking fashion. A second layer of 0 chromic was used to imbricate the first layer in a running fashion. Good hemostasis was noted. The tubes and ovaries were inspected, found to be normal.  The abdominal cavity was cleaned. The uterus was then replaced. The gutters were cleaned. Our attention turned to the lower uterine segment. Good hemostasis was noted. The bladder flap was inspected as well. Bleeding points cauterized. Good hemostasis was noted. The rectus muscles were inspected and the fascia as well. Bleeding points cauterized. Good hemostasis was noted. The fascia was closed with 0 Vicryl starting at each angle and ending in the middle. The subcutaneous tissue was irrigated, bleeding points cauterized, and good hemostasis was noted as well. The subcutaneous tissue was reapproximated with 3-0 plain suture, and the skin was closed with staples. The uterus was expressed at the end of the case. A small amount of bleeding was expressed. Pressure dressing was applied, and the patient was transported to the recovery room in stable postoperative condition. Dictated By Elise Lozada MD  d: 09/22/2022 09:32:26  t: 09/22/2022 17:11:23  [facility] [id number]  MLM/

## 2022-09-22 NOTE — PROGRESS NOTES
Patient transferred to mother/baby room 360 per cart in stable condition with baby and personal belongings. Accompanied by  and staff. Report given to BRANDON Steele,  mother/baby RN.

## 2022-09-22 NOTE — PROGRESS NOTES
Pt is a 32year old female admitted to TR5/TR5-A. Patient presents with:  Scheduled      [employer] #[id number] used for translation. Pt is  37w0d intra-uterine pregnancy. History obtained, consents signed. Oriented to room, staff, and plan of care.

## 2022-09-22 NOTE — INTERVAL H&P NOTE
Pre-op Diagnosis: pre E    The above referenced H&P was reviewed by Jacque Ambriz. João Allen MD on 9/22/2022, the patient was examined and no significant changes have occurred in the patient's condition since the H&P was performed. I discussed with the patient and/or legal representative the potential benefits, risks and side effects of this procedure; the likelihood of the patient achieving goals; and potential problems that might occur during recuperation. I discussed reasonable alternatives to the procedure, including risks, benefits and side effects related to the alternatives and risks related to not receiving this procedure. We will proceed with procedure as planned.

## 2022-09-22 NOTE — BRIEF OP NOTE
Pre-Operative Diagnosis:  2 37 wks. ;Repeat C/S  for preeclampsia     Post-Operative Diagnosis:  2 37 wks. ;Repeat C/S  for preeclampsia      Procedure Performed: Low Transverse    SECTION through Pfannenstiel skin Insicion    Surgeon(s) and Role:     Montserrat Rawls MD - Primary     * Rosa Drew MD - Assisting Surgeon    Assistant(s):        Surgical Findings: Viable Male infant, A/G 9 & 9. Wt:  7 lbs 11 oz. Normal Uterus, Fallopian Tubes and Ovaries. Specimen: Placenta and cord. Quantitative Blood Loss: Pending  IVF:  2,000 mL  Urine Output:  10 mL and clear. Patient voided just prior to surgery. Counts:  Correct x 2  Complications:  None  Condition:  Stable    Dictation Number:  Pending    Rui Voss MD  2022  9:11 AM

## 2022-09-23 LAB
BASOPHILS # BLD AUTO: 0.03 X10(3) UL (ref 0–0.2)
BASOPHILS NFR BLD AUTO: 0.3 %
DEPRECATED RDW RBC AUTO: 43.4 FL (ref 35.1–46.3)
EOSINOPHIL # BLD AUTO: 0.03 X10(3) UL (ref 0–0.7)
EOSINOPHIL NFR BLD AUTO: 0.3 %
ERYTHROCYTE [DISTWIDTH] IN BLOOD BY AUTOMATED COUNT: 13.4 % (ref 11–15)
HCT VFR BLD AUTO: 30.1 %
HGB BLD-MCNC: 10.1 G/DL
IMM GRANULOCYTES # BLD AUTO: 0.05 X10(3) UL (ref 0–1)
IMM GRANULOCYTES NFR BLD: 0.4 %
LYMPHOCYTES # BLD AUTO: 3.49 X10(3) UL (ref 1–4)
LYMPHOCYTES NFR BLD AUTO: 30.5 %
MCH RBC QN AUTO: 30.1 PG (ref 26–34)
MCHC RBC AUTO-ENTMCNC: 33.6 G/DL (ref 31–37)
MCV RBC AUTO: 89.6 FL
MONOCYTES # BLD AUTO: 0.62 X10(3) UL (ref 0.1–1)
MONOCYTES NFR BLD AUTO: 5.4 %
NEUTROPHILS # BLD AUTO: 7.24 X10 (3) UL (ref 1.5–7.7)
NEUTROPHILS # BLD AUTO: 7.24 X10(3) UL (ref 1.5–7.7)
NEUTROPHILS NFR BLD AUTO: 63.1 %
PLATELET # BLD AUTO: 200 10(3)UL (ref 150–450)
RBC # BLD AUTO: 3.36 X10(6)UL
WBC # BLD AUTO: 11.5 X10(3) UL (ref 4–11)

## 2022-09-24 NOTE — PLAN OF CARE
Problem: GASTROINTESTINAL - ADULT  Goal: Minimal or absence of nausea and vomiting  Description: INTERVENTIONS:  - Maintain adequate hydration with IV or PO as ordered and tolerated  - Nasogastric tube to low intermittent suction as ordered  - Evaluate effectiveness of ordered antiemetic medications  - Provide nonpharmacologic comfort measures as appropriate  - Advance diet as tolerated, if ordered  - Obtain nutritional consult as needed  - Evaluate fluid balance  Outcome: Progressing  Goal: Maintains or returns to baseline bowel function  Description: INTERVENTIONS:  - Assess bowel function  - Maintain adequate hydration with IV or PO as ordered and tolerated  - Evaluate effectiveness of GI medications  - Encourage mobilization and activity  - Obtain nutritional consult as needed  - Establish a toileting routine/schedule  - Consider collaborating with pharmacy to review patient's medication profile  Outcome: Progressing  Goal: Maintains adequate nutritional intake (undernourished)  Description: INTERVENTIONS:  - Monitor percentage of each meal consumed  - Identify factors contributing to decreased intake, treat as appropriate  - Assist with meals as needed  - Monitor I&O, WT and lab values  - Obtain nutritional consult as needed  - Optimize oral hygiene and moisture  - Encourage food from home; allow for food preferences  - Enhance eating environment  Outcome: Progressing  Goal: Achieves appropriate nutritional intake (bariatric)  Description: INTERVENTIONS:  - Monitor for over-consumption  - Identify factors contributing to increased intake, treat as appropriate  - Monitor I&O, WT and lab values  - Obtain nutritional consult as needed  - Evaluate psychosocial factors contributing to over-consumption  Outcome: Progressing     Problem: POSTPARTUM  Goal: Long Term Goal:Experiences normal postpartum course  Description: INTERVENTIONS:  - Assess and monitor vital signs and lab values. - Assess fundus and lochia.   - Provide ice/sitz baths for perineum discomfort. - Monitor healing of incision/episiotomy/laceration, and assess for signs and symptoms of infection and hematoma. - Assess bladder function and monitor for bladder distention.  - Provide/instruct/assist with pericare as needed. - Provide VTE prophylaxis as needed. - Monitor bowel function.  - Encourage ambulation and provide assistance as needed. - Assess and monitor emotional status and provide social service/psych resources as needed. - Utilize standard precautions and use personal protective equipment as indicated. Ensure aseptic care of all intravenous lines and invasive tubes/drains.  - Obtain immunization and exposure to communicable diseases history. Outcome: Progressing  Goal: Optimize infant feeding at the breast  Description: INTERVENTIONS:  - Initiate breast feeding within first hour after birth. - Monitor effectiveness of current breast feeding efforts. - Assess support systems available to mother/family.  - Identify cultural beliefs/practices regarding lactation, letdown techniques, maternal food preferences. - Assess mother's knowledge and previous experience with breast feeding.  - Provide information as needed about early infant feeding cues (e.g., rooting, lip smacking, sucking fingers/hand) versus late cue of crying.  - Discuss/demonstrate breast feeding aids (e.g., infant sling, nursing footstool/pillows, and breast pumps). - Encourage mother/other family members to express feelings/concerns, and actively listen. - Educate father/SO about benefits of breast feeding and how to manage common lactation challenges. - Recommend avoidance of specific medications or substances incompatible with breast feeding.  - Assess and monitor for signs of nipple pain/trauma. - Instruct and provide assistance with proper latch. - Review techniques for milk expression (breast pumping) and storage of breast milk.  Provide pumping equipment/supplies, instructions and assistance, as needed. - Encourage rooming-in and breast feeding on demand.  - Encourage skin-to-skin contact. - Provide LC support as needed. - Assess for and manage engorgement. - Provide breast feeding education handouts and information on community breast feeding support. Outcome: Progressing  Goal: Establishment of adequate milk supply with medication/procedure interruptions  Description: INTERVENTIONS:  - Review techniques for milk expression (breast pumping). - Provide pumping equipment/supplies, instructions, and assistance until it is safe to breastfeed infant. Outcome: Progressing  Goal: Experiences normal breast weaning course  Description: INTERVENTIONS:  - Assess for and manage engorgement. - Instruct on breast care. - Provide comfort measures. Outcome: Progressing  Goal: Appropriate maternal -  bonding  Description: INTERVENTIONS:  - Assess caregiver- interactions. - Assess caregiver's emotional status and coping mechanisms. - Encourage caregiver to participate in  daily care. - Assess support systems available to mother/family.  - Provide /case management support as needed. Outcome: Progressing     VSS, afebrile. Pt with intermittent pain relieved with Tylenol, Gabapentin and Motrin. Per pt had a BM. Voiding freely. Per pt passing gas. Incision with staples with scant serosanguinous drainage and intact. Fundus is firm, U/1, bleeding is scant. Plan of care reviewed with pt. Paperwork done. Questions and concerns answered. Anticipating discharge. Will continue with plan of care.

## 2022-09-25 VITALS
WEIGHT: 212 LBS | BODY MASS INDEX: 37.56 KG/M2 | OXYGEN SATURATION: 99 % | DIASTOLIC BLOOD PRESSURE: 69 MMHG | HEIGHT: 63 IN | TEMPERATURE: 99 F | HEART RATE: 110 BPM | RESPIRATION RATE: 16 BRPM | SYSTOLIC BLOOD PRESSURE: 111 MMHG

## 2022-09-25 RX ORDER — OXYCODONE HYDROCHLORIDE 5 MG/1
5 TABLET ORAL EVERY 6 HOURS PRN
Qty: 20 TABLET | Refills: 0 | Status: SHIPPED | OUTPATIENT
Start: 2022-09-25

## 2022-09-25 RX ORDER — IBUPROFEN 600 MG/1
600 TABLET ORAL EVERY 6 HOURS
Qty: 30 TABLET | Refills: 0 | Status: SHIPPED | COMMUNITY
Start: 2022-09-25

## 2022-09-25 RX ORDER — ACETAMINOPHEN 500 MG
1000 TABLET ORAL EVERY 6 HOURS
Qty: 30 TABLET | Refills: 0 | Status: SHIPPED | COMMUNITY
Start: 2022-09-25

## 2022-09-25 NOTE — PLAN OF CARE
Problem: GASTROINTESTINAL - ADULT  Goal: Minimal or absence of nausea and vomiting  Description: INTERVENTIONS:  - Maintain adequate hydration with IV or PO as ordered and tolerated  - Nasogastric tube to low intermittent suction as ordered  - Evaluate effectiveness of ordered antiemetic medications  - Provide nonpharmacologic comfort measures as appropriate  - Advance diet as tolerated, if ordered  - Obtain nutritional consult as needed  - Evaluate fluid balance  Outcome: Progressing  Goal: Maintains or returns to baseline bowel function  Description: INTERVENTIONS:  - Assess bowel function  - Maintain adequate hydration with IV or PO as ordered and tolerated  - Evaluate effectiveness of GI medications  - Encourage mobilization and activity  - Obtain nutritional consult as needed  - Establish a toileting routine/schedule  - Consider collaborating with pharmacy to review patient's medication profile  Outcome: Progressing  Goal: Maintains adequate nutritional intake (undernourished)  Description: INTERVENTIONS:  - Monitor percentage of each meal consumed  - Identify factors contributing to decreased intake, treat as appropriate  - Assist with meals as needed  - Monitor I&O, WT and lab values  - Obtain nutritional consult as needed  - Optimize oral hygiene and moisture  - Encourage food from home; allow for food preferences  - Enhance eating environment  Outcome: Progressing  Goal: Achieves appropriate nutritional intake (bariatric)  Description: INTERVENTIONS:  - Monitor for over-consumption  - Identify factors contributing to increased intake, treat as appropriate  - Monitor I&O, WT and lab values  - Obtain nutritional consult as needed  - Evaluate psychosocial factors contributing to over-consumption  Outcome: Progressing     Problem: POSTPARTUM  Goal: Long Term Goal:Experiences normal postpartum course  Description: INTERVENTIONS:  - Assess and monitor vital signs and lab values. - Assess fundus and lochia.   - Provide ice/sitz baths for perineum discomfort. - Monitor healing of incision/episiotomy/laceration, and assess for signs and symptoms of infection and hematoma. - Assess bladder function and monitor for bladder distention.  - Provide/instruct/assist with pericare as needed. - Provide VTE prophylaxis as needed. - Monitor bowel function.  - Encourage ambulation and provide assistance as needed. - Assess and monitor emotional status and provide social service/psych resources as needed. - Utilize standard precautions and use personal protective equipment as indicated. Ensure aseptic care of all intravenous lines and invasive tubes/drains.  - Obtain immunization and exposure to communicable diseases history. Outcome: Progressing  Goal: Optimize infant feeding at the breast  Description: INTERVENTIONS:  - Initiate breast feeding within first hour after birth. - Monitor effectiveness of current breast feeding efforts. - Assess support systems available to mother/family.  - Identify cultural beliefs/practices regarding lactation, letdown techniques, maternal food preferences. - Assess mother's knowledge and previous experience with breast feeding.  - Provide information as needed about early infant feeding cues (e.g., rooting, lip smacking, sucking fingers/hand) versus late cue of crying.  - Discuss/demonstrate breast feeding aids (e.g., infant sling, nursing footstool/pillows, and breast pumps). - Encourage mother/other family members to express feelings/concerns, and actively listen. - Educate father/SO about benefits of breast feeding and how to manage common lactation challenges. - Recommend avoidance of specific medications or substances incompatible with breast feeding.  - Assess and monitor for signs of nipple pain/trauma. - Instruct and provide assistance with proper latch. - Review techniques for milk expression (breast pumping) and storage of breast milk.  Provide pumping equipment/supplies, instructions and assistance, as needed. - Encourage rooming-in and breast feeding on demand.  - Encourage skin-to-skin contact. - Provide LC support as needed. - Assess for and manage engorgement. - Provide breast feeding education handouts and information on community breast feeding support. Outcome: Progressing  Goal: Establishment of adequate milk supply with medication/procedure interruptions  Description: INTERVENTIONS:  - Review techniques for milk expression (breast pumping). - Provide pumping equipment/supplies, instructions, and assistance until it is safe to breastfeed infant. Outcome: Progressing  Goal: Experiences normal breast weaning course  Description: INTERVENTIONS:  - Assess for and manage engorgement. - Instruct on breast care. - Provide comfort measures. Outcome: Progressing  Goal: Appropriate maternal -  bonding  Description: INTERVENTIONS:  - Assess caregiver- interactions. - Assess caregiver's emotional status and coping mechanisms. - Encourage caregiver to participate in  daily care. - Assess support systems available to mother/family.  - Provide /case management support as needed. Outcome: Progressing     VSS, afebrile. Pt with intermittent pain relieved with Tylenol, Motrin and Gabapentin. Voiding freely. Per pt passing gas. Incision with staples dry and intact. Fundus is firm, U/1, bleeding is scant. Plan of care reviewed with pt. Paperwork done. Questions and concerns answered. Anticipating discharge. Will continue with plan of care.

## 2022-09-26 ENCOUNTER — TELEPHONE (OUTPATIENT)
Dept: OBGYN CLINIC | Facility: CLINIC | Age: 32
End: 2022-09-26

## 2022-09-28 ENCOUNTER — NURSE ONLY (OUTPATIENT)
Dept: OBGYN CLINIC | Facility: CLINIC | Age: 32
End: 2022-09-28

## 2022-09-28 VITALS — SYSTOLIC BLOOD PRESSURE: 110 MMHG | DIASTOLIC BLOOD PRESSURE: 70 MMHG | TEMPERATURE: 98 F

## 2022-09-28 DIAGNOSIS — Z48.02 ENCOUNTER FOR STAPLE REMOVAL: Primary | ICD-10-CM

## 2022-09-28 PROCEDURE — 3074F SYST BP LT 130 MM HG: CPT | Performed by: NURSE PRACTITIONER

## 2022-09-28 PROCEDURE — 3078F DIAST BP <80 MM HG: CPT | Performed by: NURSE PRACTITIONER

## 2022-09-28 NOTE — PROGRESS NOTES
Patient name and  verified. Patient presents to clinic for staple removal. Incision is well approximated and intact. No signs of infection noted. Staples removed, cleansed with normal saline and hydrogen peroxide, and steri strips applied. Patient tolerated staple removal well. Home care instructions and infection precautions given. Patient verbalized understanding.

## 2022-10-07 ENCOUNTER — LAB ENCOUNTER (OUTPATIENT)
Dept: LAB | Age: 32
End: 2022-10-07
Attending: OBSTETRICS & GYNECOLOGY
Payer: COMMERCIAL

## 2022-10-07 PROCEDURE — 87086 URINE CULTURE/COLONY COUNT: CPT | Performed by: OBSTETRICS & GYNECOLOGY

## 2022-10-18 ENCOUNTER — OFFICE VISIT (OUTPATIENT)
Dept: FAMILY MEDICINE CLINIC | Facility: CLINIC | Age: 32
End: 2022-10-18
Payer: COMMERCIAL

## 2022-10-18 ENCOUNTER — POSTPARTUM (OUTPATIENT)
Dept: OBGYN CLINIC | Facility: CLINIC | Age: 32
End: 2022-10-18
Payer: COMMERCIAL

## 2022-10-18 VITALS
BODY MASS INDEX: 34.44 KG/M2 | SYSTOLIC BLOOD PRESSURE: 113 MMHG | WEIGHT: 194.38 LBS | DIASTOLIC BLOOD PRESSURE: 74 MMHG | HEIGHT: 63 IN | HEART RATE: 71 BPM

## 2022-10-18 VITALS — WEIGHT: 193.81 LBS | DIASTOLIC BLOOD PRESSURE: 86 MMHG | BODY MASS INDEX: 34 KG/M2 | SYSTOLIC BLOOD PRESSURE: 123 MMHG

## 2022-10-18 DIAGNOSIS — G57.12 LATERAL FEMORAL CUTANEOUS ENTRAPMENT SYNDROME, LEFT: Primary | ICD-10-CM

## 2022-10-18 PROCEDURE — 3079F DIAST BP 80-89 MM HG: CPT | Performed by: OBSTETRICS & GYNECOLOGY

## 2022-10-18 PROCEDURE — 99213 OFFICE O/P EST LOW 20 MIN: CPT | Performed by: FAMILY MEDICINE

## 2022-10-18 PROCEDURE — 3078F DIAST BP <80 MM HG: CPT | Performed by: FAMILY MEDICINE

## 2022-10-18 PROCEDURE — 3074F SYST BP LT 130 MM HG: CPT | Performed by: FAMILY MEDICINE

## 2022-10-18 PROCEDURE — 3008F BODY MASS INDEX DOCD: CPT | Performed by: FAMILY MEDICINE

## 2022-10-18 PROCEDURE — 3074F SYST BP LT 130 MM HG: CPT | Performed by: OBSTETRICS & GYNECOLOGY

## 2023-01-16 ENCOUNTER — NURSE TRIAGE (OUTPATIENT)
Dept: FAMILY MEDICINE CLINIC | Facility: CLINIC | Age: 33
End: 2023-01-16

## 2023-01-19 ENCOUNTER — HOSPITAL ENCOUNTER (OUTPATIENT)
Dept: GENERAL RADIOLOGY | Age: 33
Discharge: HOME | End: 2023-01-19
Attending: FAMILY MEDICINE
Payer: COMMERCIAL

## 2023-01-19 ENCOUNTER — OFFICE VISIT (OUTPATIENT)
Dept: FAMILY MEDICINE CLINIC | Facility: CLINIC | Age: 33
End: 2023-01-19

## 2023-01-19 VITALS
HEART RATE: 93 BPM | BODY MASS INDEX: 35.05 KG/M2 | WEIGHT: 197.81 LBS | SYSTOLIC BLOOD PRESSURE: 114 MMHG | HEIGHT: 63 IN | DIASTOLIC BLOOD PRESSURE: 76 MMHG

## 2023-01-19 DIAGNOSIS — M72.2 PLANTAR FASCIITIS: Primary | ICD-10-CM

## 2023-01-19 DIAGNOSIS — M72.2 PLANTAR FASCIITIS: ICD-10-CM

## 2023-01-19 DIAGNOSIS — E66.09 CLASS 2 OBESITY DUE TO EXCESS CALORIES WITHOUT SERIOUS COMORBIDITY WITH BODY MASS INDEX (BMI) OF 35.0 TO 35.9 IN ADULT: ICD-10-CM

## 2023-01-19 PROCEDURE — 73630 X-RAY EXAM OF FOOT: CPT | Performed by: FAMILY MEDICINE

## 2023-01-19 PROCEDURE — 3008F BODY MASS INDEX DOCD: CPT | Performed by: FAMILY MEDICINE

## 2023-01-19 PROCEDURE — 99213 OFFICE O/P EST LOW 20 MIN: CPT | Performed by: FAMILY MEDICINE

## 2023-01-19 PROCEDURE — 3078F DIAST BP <80 MM HG: CPT | Performed by: FAMILY MEDICINE

## 2023-01-19 PROCEDURE — 3074F SYST BP LT 130 MM HG: CPT | Performed by: FAMILY MEDICINE

## 2023-02-01 ENCOUNTER — OFFICE VISIT (OUTPATIENT)
Dept: FAMILY MEDICINE CLINIC | Facility: CLINIC | Age: 33
End: 2023-02-01

## 2023-02-01 VITALS
HEIGHT: 63 IN | WEIGHT: 199 LBS | HEART RATE: 75 BPM | DIASTOLIC BLOOD PRESSURE: 80 MMHG | SYSTOLIC BLOOD PRESSURE: 139 MMHG | BODY MASS INDEX: 35.26 KG/M2

## 2023-02-01 DIAGNOSIS — N91.2 AMENORRHEA: ICD-10-CM

## 2023-02-01 DIAGNOSIS — M72.2 PLANTAR FASCIITIS: Primary | ICD-10-CM

## 2023-02-01 LAB
CONTROL LINE PRESENT WITH A CLEAR BACKGROUND (YES/NO): PRESENT YES/NO
PREGNANCY TEST, URINE: NEGATIVE

## 2023-02-01 PROCEDURE — 3079F DIAST BP 80-89 MM HG: CPT | Performed by: FAMILY MEDICINE

## 2023-02-01 PROCEDURE — 99213 OFFICE O/P EST LOW 20 MIN: CPT | Performed by: FAMILY MEDICINE

## 2023-02-01 PROCEDURE — 3075F SYST BP GE 130 - 139MM HG: CPT | Performed by: FAMILY MEDICINE

## 2023-02-01 PROCEDURE — 81025 URINE PREGNANCY TEST: CPT | Performed by: FAMILY MEDICINE

## 2023-02-01 PROCEDURE — 3008F BODY MASS INDEX DOCD: CPT | Performed by: FAMILY MEDICINE

## 2023-02-01 RX ORDER — MEDROXYPROGESTERONE ACETATE 10 MG/1
10 TABLET ORAL DAILY
Qty: 10 TABLET | Refills: 0 | Status: SHIPPED | OUTPATIENT
Start: 2023-02-01 | End: 2023-02-11

## 2023-03-29 ENCOUNTER — OFFICE VISIT (OUTPATIENT)
Dept: FAMILY MEDICINE CLINIC | Facility: CLINIC | Age: 33
End: 2023-03-29

## 2023-03-29 VITALS
SYSTOLIC BLOOD PRESSURE: 132 MMHG | BODY MASS INDEX: 35.08 KG/M2 | HEART RATE: 86 BPM | DIASTOLIC BLOOD PRESSURE: 88 MMHG | WEIGHT: 198 LBS | HEIGHT: 63 IN

## 2023-03-29 DIAGNOSIS — J22 LOWER RESP. TRACT INFECTION: ICD-10-CM

## 2023-03-29 DIAGNOSIS — R05.1 ACUTE COUGH: Primary | ICD-10-CM

## 2023-03-29 PROCEDURE — 3008F BODY MASS INDEX DOCD: CPT | Performed by: PHYSICIAN ASSISTANT

## 2023-03-29 PROCEDURE — 3079F DIAST BP 80-89 MM HG: CPT | Performed by: PHYSICIAN ASSISTANT

## 2023-03-29 PROCEDURE — 3075F SYST BP GE 130 - 139MM HG: CPT | Performed by: PHYSICIAN ASSISTANT

## 2023-03-29 PROCEDURE — 99213 OFFICE O/P EST LOW 20 MIN: CPT | Performed by: PHYSICIAN ASSISTANT

## 2023-03-29 RX ORDER — AZITHROMYCIN 250 MG/1
TABLET, FILM COATED ORAL
Qty: 6 TABLET | Refills: 0 | Status: SHIPPED | OUTPATIENT
Start: 2023-03-29 | End: 2023-04-03

## 2023-03-29 RX ORDER — BENZONATATE 200 MG/1
200 CAPSULE ORAL 3 TIMES DAILY PRN
Qty: 30 CAPSULE | Refills: 0 | Status: SHIPPED | OUTPATIENT
Start: 2023-03-29

## 2023-04-19 ENCOUNTER — LAB ENCOUNTER (OUTPATIENT)
Dept: LAB | Age: 33
End: 2023-04-19
Attending: FAMILY MEDICINE
Payer: COMMERCIAL

## 2023-04-19 ENCOUNTER — OFFICE VISIT (OUTPATIENT)
Dept: FAMILY MEDICINE CLINIC | Facility: CLINIC | Age: 33
End: 2023-04-19

## 2023-04-19 VITALS
DIASTOLIC BLOOD PRESSURE: 96 MMHG | BODY MASS INDEX: 35.55 KG/M2 | WEIGHT: 200.63 LBS | HEART RATE: 85 BPM | SYSTOLIC BLOOD PRESSURE: 144 MMHG | HEIGHT: 63 IN

## 2023-04-19 DIAGNOSIS — R53.83 FATIGUE, UNSPECIFIED TYPE: Primary | ICD-10-CM

## 2023-04-19 DIAGNOSIS — R53.83 FATIGUE, UNSPECIFIED TYPE: ICD-10-CM

## 2023-04-19 DIAGNOSIS — E66.09 CLASS 2 OBESITY DUE TO EXCESS CALORIES WITHOUT SERIOUS COMORBIDITY WITH BODY MASS INDEX (BMI) OF 35.0 TO 35.9 IN ADULT: ICD-10-CM

## 2023-04-19 LAB
CUVETTE LOT #: NORMAL NUMERIC
GLUCOSE BLOOD: 94
HEMOGLOBIN: 13.3 G/DL (ref 12–16)
TEST STRIP LOT #: NORMAL NUMERIC
TSI SER-ACNC: 2.16 MIU/ML (ref 0.36–3.74)

## 2023-04-19 PROCEDURE — 3080F DIAST BP >= 90 MM HG: CPT | Performed by: FAMILY MEDICINE

## 2023-04-19 PROCEDURE — 85018 HEMOGLOBIN: CPT | Performed by: FAMILY MEDICINE

## 2023-04-19 PROCEDURE — 3008F BODY MASS INDEX DOCD: CPT | Performed by: FAMILY MEDICINE

## 2023-04-19 PROCEDURE — 82962 GLUCOSE BLOOD TEST: CPT | Performed by: FAMILY MEDICINE

## 2023-04-19 PROCEDURE — 84443 ASSAY THYROID STIM HORMONE: CPT

## 2023-04-19 PROCEDURE — 99213 OFFICE O/P EST LOW 20 MIN: CPT | Performed by: FAMILY MEDICINE

## 2023-04-19 PROCEDURE — 36415 COLL VENOUS BLD VENIPUNCTURE: CPT

## 2023-04-19 PROCEDURE — 3077F SYST BP >= 140 MM HG: CPT | Performed by: FAMILY MEDICINE

## 2023-05-09 ENCOUNTER — OFFICE VISIT (OUTPATIENT)
Dept: FAMILY MEDICINE CLINIC | Facility: CLINIC | Age: 33
End: 2023-05-09

## 2023-05-09 ENCOUNTER — LAB ENCOUNTER (OUTPATIENT)
Dept: LAB | Age: 33
End: 2023-05-09
Attending: FAMILY MEDICINE
Payer: COMMERCIAL

## 2023-05-09 ENCOUNTER — EKG ENCOUNTER (OUTPATIENT)
Dept: LAB | Age: 33
End: 2023-05-09
Attending: FAMILY MEDICINE
Payer: COMMERCIAL

## 2023-05-09 VITALS
SYSTOLIC BLOOD PRESSURE: 133 MMHG | HEART RATE: 87 BPM | TEMPERATURE: 98 F | DIASTOLIC BLOOD PRESSURE: 90 MMHG | WEIGHT: 198 LBS | BODY MASS INDEX: 35 KG/M2

## 2023-05-09 DIAGNOSIS — L70.0 ACNE VULGARIS: ICD-10-CM

## 2023-05-09 DIAGNOSIS — Z00.00 PHYSICAL EXAM: Primary | ICD-10-CM

## 2023-05-09 DIAGNOSIS — Z00.00 PHYSICAL EXAM: ICD-10-CM

## 2023-05-09 DIAGNOSIS — E66.09 CLASS 2 OBESITY DUE TO EXCESS CALORIES WITHOUT SERIOUS COMORBIDITY WITH BODY MASS INDEX (BMI) OF 35.0 TO 35.9 IN ADULT: ICD-10-CM

## 2023-05-09 LAB
ALBUMIN SERPL-MCNC: 3.5 G/DL (ref 3.4–5)
ALBUMIN/GLOB SERPL: 0.9 {RATIO} (ref 1–2)
ALP LIVER SERPL-CCNC: 65 U/L
ALT SERPL-CCNC: 70 U/L
ANION GAP SERPL CALC-SCNC: 11 MMOL/L (ref 0–18)
AST SERPL-CCNC: 47 U/L (ref 15–37)
ATRIAL RATE: 65 BPM
BASOPHILS # BLD AUTO: 0.05 X10(3) UL (ref 0–0.2)
BASOPHILS NFR BLD AUTO: 0.6 %
BILIRUB SERPL-MCNC: 0.3 MG/DL (ref 0.1–2)
BILIRUB UR QL: NEGATIVE
BUN BLD-MCNC: 14 MG/DL (ref 7–18)
BUN/CREAT SERPL: 20.6 (ref 10–20)
CALCIUM BLD-MCNC: 9.2 MG/DL (ref 8.5–10.1)
CHLORIDE SERPL-SCNC: 107 MMOL/L (ref 98–112)
CHOLEST SERPL-MCNC: 164 MG/DL (ref ?–200)
CLARITY UR: CLEAR
CO2 SERPL-SCNC: 23 MMOL/L (ref 21–32)
CREAT BLD-MCNC: 0.68 MG/DL
DEPRECATED RDW RBC AUTO: 38.9 FL (ref 35.1–46.3)
EOSINOPHIL # BLD AUTO: 0.18 X10(3) UL (ref 0–0.7)
EOSINOPHIL NFR BLD AUTO: 2.2 %
ERYTHROCYTE [DISTWIDTH] IN BLOOD BY AUTOMATED COUNT: 12.4 % (ref 11–15)
EST. AVERAGE GLUCOSE BLD GHB EST-MCNC: 120 MG/DL (ref 68–126)
FASTING PATIENT LIPID ANSWER: YES
FASTING STATUS PATIENT QL REPORTED: YES
GFR SERPLBLD BASED ON 1.73 SQ M-ARVRAT: 119 ML/MIN/1.73M2 (ref 60–?)
GLOBULIN PLAS-MCNC: 3.7 G/DL (ref 2.8–4.4)
GLUCOSE BLD-MCNC: 104 MG/DL (ref 70–99)
GLUCOSE UR-MCNC: NORMAL MG/DL
HBA1C MFR BLD: 5.8 % (ref ?–5.7)
HCT VFR BLD AUTO: 39.6 %
HDLC SERPL-MCNC: 50 MG/DL (ref 40–59)
HGB BLD-MCNC: 13.4 G/DL
HGB UR QL STRIP.AUTO: NEGATIVE
IMM GRANULOCYTES # BLD AUTO: 0.02 X10(3) UL (ref 0–1)
IMM GRANULOCYTES NFR BLD: 0.2 %
KETONES UR-MCNC: NEGATIVE MG/DL
LDLC SERPL CALC-MCNC: 64 MG/DL (ref ?–100)
LEUKOCYTE ESTERASE UR QL STRIP.AUTO: NEGATIVE
LYMPHOCYTES # BLD AUTO: 3.24 X10(3) UL (ref 1–4)
LYMPHOCYTES NFR BLD AUTO: 40.2 %
MCH RBC QN AUTO: 29.2 PG (ref 26–34)
MCHC RBC AUTO-ENTMCNC: 33.8 G/DL (ref 31–37)
MCV RBC AUTO: 86.3 FL
MONOCYTES # BLD AUTO: 0.4 X10(3) UL (ref 0.1–1)
MONOCYTES NFR BLD AUTO: 5 %
NEUTROPHILS # BLD AUTO: 4.16 X10 (3) UL (ref 1.5–7.7)
NEUTROPHILS # BLD AUTO: 4.16 X10(3) UL (ref 1.5–7.7)
NEUTROPHILS NFR BLD AUTO: 51.8 %
NITRITE UR QL STRIP.AUTO: NEGATIVE
NONHDLC SERPL-MCNC: 114 MG/DL (ref ?–130)
OSMOLALITY SERPL CALC.SUM OF ELEC: 293 MOSM/KG (ref 275–295)
P AXIS: 33 DEGREES
P-R INTERVAL: 140 MS
PH UR: 6.5 [PH] (ref 5–8)
PLATELET # BLD AUTO: 315 10(3)UL (ref 150–450)
POTASSIUM SERPL-SCNC: 3.5 MMOL/L (ref 3.5–5.1)
PROT SERPL-MCNC: 7.2 G/DL (ref 6.4–8.2)
PROT UR-MCNC: 30 MG/DL
Q-T INTERVAL: 408 MS
QRS DURATION: 86 MS
QTC CALCULATION (BEZET): 424 MS
R AXIS: 14 DEGREES
RBC # BLD AUTO: 4.59 X10(6)UL
SODIUM SERPL-SCNC: 141 MMOL/L (ref 136–145)
SP GR UR STRIP: 1.02 (ref 1–1.03)
T AXIS: 21 DEGREES
TRIGL SERPL-MCNC: 322 MG/DL (ref 30–149)
TSI SER-ACNC: 2.69 MIU/ML (ref 0.36–3.74)
UROBILINOGEN UR STRIP-ACNC: NORMAL
VENTRICULAR RATE: 65 BPM
VIT D+METAB SERPL-MCNC: 10.9 NG/ML (ref 30–100)
VLDLC SERPL CALC-MCNC: 48 MG/DL (ref 0–30)
WBC # BLD AUTO: 8.1 X10(3) UL (ref 4–11)

## 2023-05-09 PROCEDURE — 82306 VITAMIN D 25 HYDROXY: CPT

## 2023-05-09 PROCEDURE — G0438 PPPS, INITIAL VISIT: HCPCS | Performed by: FAMILY MEDICINE

## 2023-05-09 PROCEDURE — 3075F SYST BP GE 130 - 139MM HG: CPT | Performed by: FAMILY MEDICINE

## 2023-05-09 PROCEDURE — 93010 ELECTROCARDIOGRAM REPORT: CPT | Performed by: INTERNAL MEDICINE

## 2023-05-09 PROCEDURE — 80053 COMPREHEN METABOLIC PANEL: CPT

## 2023-05-09 PROCEDURE — 99213 OFFICE O/P EST LOW 20 MIN: CPT | Performed by: FAMILY MEDICINE

## 2023-05-09 PROCEDURE — 99395 PREV VISIT EST AGE 18-39: CPT | Performed by: FAMILY MEDICINE

## 2023-05-09 PROCEDURE — 81001 URINALYSIS AUTO W/SCOPE: CPT

## 2023-05-09 PROCEDURE — 80061 LIPID PANEL: CPT

## 2023-05-09 PROCEDURE — 84443 ASSAY THYROID STIM HORMONE: CPT

## 2023-05-09 PROCEDURE — 96372 THER/PROPH/DIAG INJ SC/IM: CPT | Performed by: FAMILY MEDICINE

## 2023-05-09 PROCEDURE — 3080F DIAST BP >= 90 MM HG: CPT | Performed by: FAMILY MEDICINE

## 2023-05-09 PROCEDURE — 83036 HEMOGLOBIN GLYCOSYLATED A1C: CPT

## 2023-05-09 PROCEDURE — 85025 COMPLETE CBC W/AUTO DIFF WBC: CPT

## 2023-05-09 PROCEDURE — 93005 ELECTROCARDIOGRAM TRACING: CPT

## 2023-05-09 PROCEDURE — 36415 COLL VENOUS BLD VENIPUNCTURE: CPT

## 2023-05-09 RX ORDER — ERGOCALCIFEROL 1.25 MG/1
50000 CAPSULE ORAL WEEKLY
Qty: 12 CAPSULE | Refills: 4 | Status: SHIPPED | OUTPATIENT
Start: 2023-05-09 | End: 2023-06-08

## 2023-05-09 RX ORDER — CLINDAMYCIN AND BENZOYL PEROXIDE 10; 50 MG/G; MG/G
1 GEL TOPICAL NIGHTLY
Qty: 50 G | Refills: 2 | Status: SHIPPED | OUTPATIENT
Start: 2023-05-09 | End: 2024-05-03

## 2023-05-09 RX ORDER — PHENTERMINE HYDROCHLORIDE 37.5 MG/1
37.5 CAPSULE ORAL EVERY MORNING
Qty: 30 CAPSULE | Refills: 0 | Status: SHIPPED | OUTPATIENT
Start: 2023-05-09

## 2023-05-09 RX ORDER — CYANOCOBALAMIN 1000 UG/ML
1000 INJECTION, SOLUTION INTRAMUSCULAR; SUBCUTANEOUS ONCE
Status: COMPLETED | OUTPATIENT
Start: 2023-05-09 | End: 2023-05-09

## 2023-05-09 RX ADMIN — CYANOCOBALAMIN 1000 MCG: 1000 INJECTION, SOLUTION INTRAMUSCULAR; SUBCUTANEOUS at 10:18:00

## 2023-05-10 ENCOUNTER — TELEPHONE (OUTPATIENT)
Dept: FAMILY MEDICINE CLINIC | Facility: CLINIC | Age: 33
End: 2023-05-10

## 2023-05-10 RX ORDER — CLINDAMYCIN PHOSPHATE AND BENZOYL PEROXIDE 10; 50 MG/G; MG/G
GEL TOPICAL
Qty: 45 G | Refills: 1 | Status: SHIPPED | OUTPATIENT
Start: 2023-05-10

## 2023-06-17 ENCOUNTER — OFFICE VISIT (OUTPATIENT)
Dept: FAMILY MEDICINE CLINIC | Facility: CLINIC | Age: 33
End: 2023-06-17

## 2023-06-17 VITALS
HEIGHT: 63 IN | HEART RATE: 83 BPM | DIASTOLIC BLOOD PRESSURE: 84 MMHG | BODY MASS INDEX: 33.38 KG/M2 | WEIGHT: 188.38 LBS | SYSTOLIC BLOOD PRESSURE: 132 MMHG

## 2023-06-17 DIAGNOSIS — E66.09 CLASS 1 OBESITY DUE TO EXCESS CALORIES WITHOUT SERIOUS COMORBIDITY WITH BODY MASS INDEX (BMI) OF 33.0 TO 33.9 IN ADULT: Primary | ICD-10-CM

## 2023-06-17 DIAGNOSIS — R73.03 PREDIABETES: ICD-10-CM

## 2023-06-17 DIAGNOSIS — E78.1 HYPERTRIGLYCERIDEMIA: ICD-10-CM

## 2023-06-17 PROCEDURE — 3079F DIAST BP 80-89 MM HG: CPT | Performed by: FAMILY MEDICINE

## 2023-06-17 PROCEDURE — 99213 OFFICE O/P EST LOW 20 MIN: CPT | Performed by: FAMILY MEDICINE

## 2023-06-17 PROCEDURE — 3075F SYST BP GE 130 - 139MM HG: CPT | Performed by: FAMILY MEDICINE

## 2023-06-17 PROCEDURE — 3008F BODY MASS INDEX DOCD: CPT | Performed by: FAMILY MEDICINE

## 2023-06-17 RX ORDER — CYANOCOBALAMIN 1000 UG/ML
1000 INJECTION, SOLUTION INTRAMUSCULAR; SUBCUTANEOUS ONCE
Status: COMPLETED | OUTPATIENT
Start: 2023-06-17 | End: 2023-06-17

## 2023-06-17 RX ORDER — PHENTERMINE HYDROCHLORIDE 30 MG/1
30 CAPSULE ORAL EVERY MORNING
Qty: 30 CAPSULE | Refills: 0 | Status: SHIPPED | OUTPATIENT
Start: 2023-06-17

## 2023-06-17 RX ADMIN — CYANOCOBALAMIN 1000 MCG: 1000 INJECTION, SOLUTION INTRAMUSCULAR; SUBCUTANEOUS at 13:23:00

## 2023-08-14 ENCOUNTER — OFFICE VISIT (OUTPATIENT)
Dept: FAMILY MEDICINE CLINIC | Facility: CLINIC | Age: 33
End: 2023-08-14

## 2023-08-14 VITALS
SYSTOLIC BLOOD PRESSURE: 136 MMHG | BODY MASS INDEX: 32.96 KG/M2 | DIASTOLIC BLOOD PRESSURE: 102 MMHG | WEIGHT: 186 LBS | HEART RATE: 88 BPM | HEIGHT: 63 IN

## 2023-08-14 DIAGNOSIS — I10 PRIMARY HYPERTENSION: Primary | ICD-10-CM

## 2023-08-14 DIAGNOSIS — E66.09 CLASS 1 OBESITY DUE TO EXCESS CALORIES WITHOUT SERIOUS COMORBIDITY WITH BODY MASS INDEX (BMI) OF 33.0 TO 33.9 IN ADULT: ICD-10-CM

## 2023-08-14 PROCEDURE — 3008F BODY MASS INDEX DOCD: CPT | Performed by: FAMILY MEDICINE

## 2023-08-14 PROCEDURE — 3075F SYST BP GE 130 - 139MM HG: CPT | Performed by: FAMILY MEDICINE

## 2023-08-14 PROCEDURE — 99213 OFFICE O/P EST LOW 20 MIN: CPT | Performed by: FAMILY MEDICINE

## 2023-08-14 PROCEDURE — 3080F DIAST BP >= 90 MM HG: CPT | Performed by: FAMILY MEDICINE

## 2023-08-14 RX ORDER — HYDROCHLOROTHIAZIDE 12.5 MG/1
12.5 CAPSULE, GELATIN COATED ORAL DAILY
Qty: 30 CAPSULE | Refills: 3 | Status: SHIPPED | OUTPATIENT
Start: 2023-08-14

## 2023-08-18 ENCOUNTER — OFFICE VISIT (OUTPATIENT)
Dept: FAMILY MEDICINE CLINIC | Facility: CLINIC | Age: 33
End: 2023-08-18

## 2023-08-18 ENCOUNTER — NURSE TRIAGE (OUTPATIENT)
Dept: FAMILY MEDICINE CLINIC | Facility: CLINIC | Age: 33
End: 2023-08-18

## 2023-08-18 VITALS
WEIGHT: 189 LBS | HEIGHT: 63 IN | BODY MASS INDEX: 33.49 KG/M2 | HEART RATE: 88 BPM | SYSTOLIC BLOOD PRESSURE: 156 MMHG | DIASTOLIC BLOOD PRESSURE: 110 MMHG

## 2023-08-18 DIAGNOSIS — R00.2 PALPITATIONS: ICD-10-CM

## 2023-08-18 DIAGNOSIS — I10 PRIMARY HYPERTENSION: Primary | ICD-10-CM

## 2023-08-18 PROCEDURE — 3077F SYST BP >= 140 MM HG: CPT | Performed by: FAMILY MEDICINE

## 2023-08-18 PROCEDURE — 99214 OFFICE O/P EST MOD 30 MIN: CPT | Performed by: FAMILY MEDICINE

## 2023-08-18 PROCEDURE — 3080F DIAST BP >= 90 MM HG: CPT | Performed by: FAMILY MEDICINE

## 2023-08-18 PROCEDURE — 3008F BODY MASS INDEX DOCD: CPT | Performed by: FAMILY MEDICINE

## 2023-08-18 RX ORDER — SERTRALINE HYDROCHLORIDE 25 MG/1
25 TABLET, FILM COATED ORAL DAILY
Qty: 30 TABLET | Refills: 1 | Status: SHIPPED | OUTPATIENT
Start: 2023-08-18

## 2023-08-18 RX ORDER — LABETALOL 100 MG/1
100 TABLET, FILM COATED ORAL 2 TIMES DAILY
Qty: 60 TABLET | Refills: 1 | Status: SHIPPED | OUTPATIENT
Start: 2023-08-18 | End: 2023-08-19

## 2023-08-18 RX ORDER — ERGOCALCIFEROL 1.25 MG/1
CAPSULE ORAL
COMMUNITY
Start: 2023-08-14

## 2023-08-18 NOTE — TELEPHONE ENCOUNTER
Patient is calling to advise that she is very tired and sleepy since she saw PCP on 8/14/23 and started the new medication on 8/15/2023. Is this a side effect of the medication? Please advise.

## 2023-08-18 NOTE — PROGRESS NOTES
No chest pain and no dyspnea. Blood pressure (!) 156/110, pulse 88, height 5' 3\" (1.6 m), weight 189 lb (85.7 kg), last menstrual period 08/12/2023, not currently breastfeeding. Episode of palpitations earlier today. Also reports that she did not have chest pain or dyspnea she describes himself as anxious. She is interested in trying medication. She also reports that she does not do any drugs. She has 3 children and makes it to work every day. She is not suicidal.  She is currently receiving medication for high blood pressure. Last coffee was yesterday caffeinated.     Objective heart regular rate rhythm no murmurs    Appropriate mood and affect    Neck supple no carotid bruits    Assessment #1 hypertension uncontrolled #2 palpitations #3 anxiety    Plan #1 discontinue hydrochlorothiazide start labetalol follow-up in 1 week with PCP Dr. Snow Monday #2 EKG today stay away from caffeine #3 sertraline prescription

## 2023-08-18 NOTE — TELEPHONE ENCOUNTER
Using language line : Yadiel Esquivel number [id number]  Action Requested: Summary for Provider     []  Critical Lab, Recommendations Needed  [] Need Additional Advice  []   FYI    []   Need Orders  [] Need Medications Sent to Pharmacy  []  Other     SUMMARY: Per protocol disposition advised office visit today     Future Appointments   Date Time Provider J Luis Timmons   8/18/2023  4:30 PM Caren Eliazar [facility] [place]   9/7/2023  2:00 PM Felisha Huang MD [facility]     Reason for call: Condition Update and Fatigue (/)  Onset: 4 days     Patient called in asking if hydrochlorothiazide could be causing her to feel fatigued. Patient states since she started hydrochlorothiazide she has felt very \"tired and lazy. \"     Confirmed with patient she does check blood pressure intermittently but hasn't checked in the past few days does have readings with her but states blood pressure has improved and headaches have resolved. Patient states she has some palpiations intermittently but this is not new, she has with anxiety. Not daily and is not happening currently. Denies other symptoms marked no under protocol.          Reason for Disposition   Taking a medicine that could cause weakness (e.g., blood pressure medications, diuretics)    Protocols used: Weakness (Generalized) and Fatigue-A-OH

## 2023-08-19 ENCOUNTER — EKG ENCOUNTER (OUTPATIENT)
Dept: LAB | Age: 33
End: 2023-08-19
Attending: FAMILY MEDICINE
Payer: COMMERCIAL

## 2023-08-19 DIAGNOSIS — R00.2 PALPITATIONS: ICD-10-CM

## 2023-08-19 LAB
ATRIAL RATE: 67 BPM
P AXIS: 26 DEGREES
P-R INTERVAL: 158 MS
Q-T INTERVAL: 420 MS
QRS DURATION: 84 MS
QTC CALCULATION (BEZET): 443 MS
R AXIS: 4 DEGREES
T AXIS: 22 DEGREES
VENTRICULAR RATE: 67 BPM

## 2023-08-19 PROCEDURE — 93005 ELECTROCARDIOGRAM TRACING: CPT

## 2023-08-19 PROCEDURE — 93010 ELECTROCARDIOGRAM REPORT: CPT | Performed by: INTERNAL MEDICINE

## 2023-08-19 RX ORDER — LABETALOL 100 MG/1
100 TABLET, FILM COATED ORAL 2 TIMES DAILY
Qty: 180 TABLET | Refills: 0 | Status: SHIPPED | OUTPATIENT
Start: 2023-08-19

## 2023-08-19 RX ORDER — LABETALOL 100 MG/1
100 TABLET, FILM COATED ORAL 2 TIMES DAILY
Qty: 180 TABLET | Refills: 0 | OUTPATIENT
Start: 2023-08-19

## 2023-09-07 ENCOUNTER — OFFICE VISIT (OUTPATIENT)
Dept: FAMILY MEDICINE CLINIC | Facility: CLINIC | Age: 33
End: 2023-09-07

## 2023-09-07 VITALS
BODY MASS INDEX: 34.27 KG/M2 | WEIGHT: 193.38 LBS | DIASTOLIC BLOOD PRESSURE: 70 MMHG | HEART RATE: 81 BPM | SYSTOLIC BLOOD PRESSURE: 130 MMHG | HEIGHT: 63 IN

## 2023-09-07 DIAGNOSIS — I10 PRIMARY HYPERTENSION: Primary | ICD-10-CM

## 2023-09-07 PROCEDURE — 3078F DIAST BP <80 MM HG: CPT | Performed by: FAMILY MEDICINE

## 2023-09-07 PROCEDURE — 3075F SYST BP GE 130 - 139MM HG: CPT | Performed by: FAMILY MEDICINE

## 2023-09-07 PROCEDURE — 3008F BODY MASS INDEX DOCD: CPT | Performed by: FAMILY MEDICINE

## 2023-09-07 PROCEDURE — 99213 OFFICE O/P EST LOW 20 MIN: CPT | Performed by: FAMILY MEDICINE

## 2023-09-07 RX ORDER — OLMESARTAN MEDOXOMIL AND HYDROCHLOROTHIAZIDE 20/12.5 20; 12.5 MG/1; MG/1
1 TABLET ORAL DAILY
Qty: 30 TABLET | Refills: 3 | Status: SHIPPED | OUTPATIENT
Start: 2023-09-07 | End: 2024-09-01

## 2023-09-08 ENCOUNTER — TELEPHONE (OUTPATIENT)
Dept: FAMILY MEDICINE CLINIC | Facility: CLINIC | Age: 33
End: 2023-09-08

## 2023-09-08 NOTE — TELEPHONE ENCOUNTER
Dr Kim Appl: Armando questions allergy note: hydrochlorathiazide on profile. Is this a true allergy? Provider sent RX for olmesartan/hydrochlorothiazide on 9/7/23. It appears it was entered as allergy on 9/7/23 by Ange Swann.

## 2023-09-25 ENCOUNTER — LAB ENCOUNTER (OUTPATIENT)
Dept: LAB | Age: 33
End: 2023-09-25
Attending: FAMILY MEDICINE
Payer: COMMERCIAL

## 2023-09-25 ENCOUNTER — OFFICE VISIT (OUTPATIENT)
Dept: FAMILY MEDICINE CLINIC | Facility: CLINIC | Age: 33
End: 2023-09-25

## 2023-09-25 VITALS
BODY MASS INDEX: 34.91 KG/M2 | DIASTOLIC BLOOD PRESSURE: 80 MMHG | WEIGHT: 197 LBS | SYSTOLIC BLOOD PRESSURE: 120 MMHG | TEMPERATURE: 98 F | HEIGHT: 63 IN | HEART RATE: 93 BPM

## 2023-09-25 DIAGNOSIS — Z23 INFLUENZA VACCINE NEEDED: ICD-10-CM

## 2023-09-25 DIAGNOSIS — I10 PRIMARY HYPERTENSION: ICD-10-CM

## 2023-09-25 DIAGNOSIS — K76.0 FATTY LIVER: ICD-10-CM

## 2023-09-25 DIAGNOSIS — I10 PRIMARY HYPERTENSION: Primary | ICD-10-CM

## 2023-09-25 DIAGNOSIS — R79.89 ELEVATED LIVER FUNCTION TESTS: ICD-10-CM

## 2023-09-25 LAB
ALBUMIN SERPL-MCNC: 3.5 G/DL (ref 3.4–5)
ALBUMIN/GLOB SERPL: 0.8 {RATIO} (ref 1–2)
ALP LIVER SERPL-CCNC: 81 U/L
ALT SERPL-CCNC: 31 U/L
ANION GAP SERPL CALC-SCNC: 4 MMOL/L (ref 0–18)
AST SERPL-CCNC: 15 U/L (ref 15–37)
BILIRUB SERPL-MCNC: 0.2 MG/DL (ref 0.1–2)
BUN BLD-MCNC: 15 MG/DL (ref 7–18)
CALCIUM BLD-MCNC: 9 MG/DL (ref 8.5–10.1)
CHLORIDE SERPL-SCNC: 105 MMOL/L (ref 98–112)
CO2 SERPL-SCNC: 30 MMOL/L (ref 21–32)
CREAT BLD-MCNC: 0.81 MG/DL
EGFRCR SERPLBLD CKD-EPI 2021: 99 ML/MIN/1.73M2 (ref 60–?)
FASTING STATUS PATIENT QL REPORTED: NO
GLOBULIN PLAS-MCNC: 4.3 G/DL (ref 2.8–4.4)
GLUCOSE BLD-MCNC: 99 MG/DL (ref 70–99)
OSMOLALITY SERPL CALC.SUM OF ELEC: 289 MOSM/KG (ref 275–295)
POTASSIUM SERPL-SCNC: 3.7 MMOL/L (ref 3.5–5.1)
PROT SERPL-MCNC: 7.8 G/DL (ref 6.4–8.2)
SODIUM SERPL-SCNC: 139 MMOL/L (ref 136–145)

## 2023-09-25 PROCEDURE — 3079F DIAST BP 80-89 MM HG: CPT | Performed by: FAMILY MEDICINE

## 2023-09-25 PROCEDURE — 99214 OFFICE O/P EST MOD 30 MIN: CPT | Performed by: FAMILY MEDICINE

## 2023-09-25 PROCEDURE — 80053 COMPREHEN METABOLIC PANEL: CPT

## 2023-09-25 PROCEDURE — 3074F SYST BP LT 130 MM HG: CPT | Performed by: FAMILY MEDICINE

## 2023-09-25 PROCEDURE — 90471 IMMUNIZATION ADMIN: CPT | Performed by: FAMILY MEDICINE

## 2023-09-25 PROCEDURE — 3008F BODY MASS INDEX DOCD: CPT | Performed by: FAMILY MEDICINE

## 2023-09-25 PROCEDURE — 90686 IIV4 VACC NO PRSV 0.5 ML IM: CPT | Performed by: FAMILY MEDICINE

## 2023-09-25 PROCEDURE — 36415 COLL VENOUS BLD VENIPUNCTURE: CPT

## 2023-10-23 NOTE — LETTER
How Severe Are They?: moderate Ravinder Meek (Legal Name)     33 year old, 10/9/1990    Legal sex: Female    Marital status:     Race: White    Ethnicity:  OR  ETHNICITY    Languages    Pashto     [address]  [place] [address]    Employer: [employer]    MRN: [id number]    CSN: [id number]     Contact Information  [phone] (Mobile)   [phone] (Home Phone)  [phone] (Work Phone)  [phone] (Home Phone)  [phone] (Home Phone)        Active Insurance as of 2024    Primary Coverage    Payor Plan Insurance Group Employer/Plan Group   BCBS IHP HMO IHP BLUE PRECISION HMO [id number]    Payor Plan Address Payor Plan Phone Number Payor Plan Fax Number Effective Dates   [address] [facility]   2022 - None [address] [address]      Subscriber Name Subscriber Birth Date Member ID    RAVINDER MEEK 10/9/1990 [id number]    Guarantor Name (ID) Guarantor Birth Date Guarantor Address Guarantor Type   RAVINDER MEEK ([id number]) 10/9/1990 [id number] Personal/Family     [place] [address]                            [address] [address]  Ph:[phone]  Fax:[phone]        Patient Information:  Patient Name:   Address: Ravinder Sy, ([id number])  [id number] [place] [address] [phone] (home) [phone] (work) Sex: female          : 10/9/1990   ________________________________________________________________  Referral Information:  Order Date: 2024       Epic Order #: [id number]   Referral Type: DME - External Dx: MOE (obstructive sleep apnea) (G47.33)   Signed Referral [place]:        Comments: CPAP at 10 cm H2O, with humidity at 3 and EPR on.  Neto & Alis Craig mask, size Small.     CPAP machine, mask, tubing, filters and all related supplies  Number of Visits: 1    ______________________________________________________________  Scheduling Information:           **REFERRAL REQUEST**     Your physician has referred you to a specialist.  Your physician or the clinic staff will provide you with the phone number you should call to schedule your appointment.      If you are confident that your benefit plan will not require a referral or authorization, such as [place] Medicaid, please feel free to schedule your appointment immediately. However, if you are unsure about the requirements for authorization, please wait 5-7 days and then contact your physician's office. At that time, you will be provided with any authorization numbers or be assured that none are required. You can then schedule your appointment. Failure to obtain required authorization numbers can create reimbursement difficulties for you.     _______________________________________________________         Referred to Location Information      Location Address [facility] [address] [place] [address] [phone]            Electronically Signed By: Tanvir Castillo MD [NPI: [id number]]  Order Date: 2024 at 8:22 AM           Referral  Referral # [id number]  Referral Notes  Number of Notes: 3  .  Type Date User Summary Attachment   Letter 2024  8:50 AM Ary Trinh [employer]: [id number] REVIEW RFL -   Note:  PATIENT NAME:  RAVINDER CORONADO/ [phone] (home)/ [phone] (work)  PATIENT :  10/09/1990  REFERRAL ID #:  [id number]  REFERRAL STATUS:  Authorized [1]  REVIEW REFERRAL NOTES FOR MORE INFORMATION:  DATE AUTHORIZED:  2024 // EXPIRATION DATE: 2024   REFERRED BY:  TANVIR CASTILLO MD (TEL: [phone])  REFERRED TO: No referral provider, MD (TEL: No Referred to Provider on Referral)     No vendor specified on referral. / No vendor phone number known.  [employer]®  REFERRED FOR:    Diagnoses:    G47.33 (ICD-10-CM) -  Is This A New Presentation, Or A Follow-Up?: Follow Up Keloids 327.23 (ICD-9-CM) - MOE (obstructive sleep apnea)  Procedures:     203533 - DME - EXTERNAL    NUMBER OF VISITS AUTH: 1        .  Type Date User Summary Attachment   Prior Authorization Confirmed/Denied/NA 06/25/2024  8:50 AM Aristeohenriquechapitolacie Ary Auth to be obtained by vendor if required per Critical access hospital guidelines / Approved for tracking only -   Note:  Auth to be obtained by vendor if required per Critical access hospital guidelines / Approved for tracking only  .             [facility]  Accredited by the American Academy of Sleep Medicine (AASM)            PATIENT'S NAME: RAVINDER ABBOTT    ATTENDING PHYSICIAN: Patrick Castillo MD    REFERRING PHYSICIAN: Patrick Castillo MD    PATIENT ACCOUNT #: [id number] LOCATION: Sleep Center    MEDICAL RECORD #: [id number] YOB: 1990    DATE OF STUDY: 06/10/2024           SLEEP STUDY REPORT     STUDY TYPE:  Home sleep test.     INDICATION:  Suspected obstructive sleep apnea (ICD-10 code G47.33), in a patient with witnessed apneic events, snoring, nocturnal awakenings, excessive daytime somnolence, migraine headaches, obesity with body mass index of 36.3, and an Home Sleepiness Scale score of 14/24.     RESULTS:  The patient underwent home sleep test with measurement of her nasal airflow, nasal air pressure, snoring, chest and abdominal wall motion, oximetry, and body position.  I have reviewed the entirety of the raw data of this study.  During the study, the total recording time is 464 minutes.  The lights-out clock time is 9:41 p.m.; the lights-on clock time is 5:26 a.m.  The apnea plus hypopnea index is 19.7 events per hour.  The supine apnea plus hypopnea index is 27.3 events per hour.  The average oxygen saturation is 96%, the lowest oxygen saturation is 79%, and the patient spent 0.7% of the test with saturations 88% or less.  The average heart rate is 72 beats per minute.  The patient spent approximately 50% of the test in the supine  position.     INTERPRETATION:  The data generated from this study is consistent with moderately severe obstructive sleep apnea which is worse in the supine position (ICD-10 code G47.33).     RECOMMENDATIONS:    1.       CPAP titration.    2.       Weight loss.    3.       Avoid alcohol.   4.       Avoid sedating drug.    5.       Patient should not drive if at all sleepy.       Please do not hesitate to contact me if there is any question whatsoever regarding interpretation of this study.     Dictated By Luis Alfredo Hoyos MD  d:06/12/2024 15:30:23  t:06/12/2024 16:15:43  [id number]  [facility]/     cc:Patrick Castillo MD          Last Resulted: 06/12/24  3:30 PM       Order Details        View Encounter        Lab and Collection Details        Routing        Result History     View All Conversations on this Encounter           Result Care Coordination      Result Notes     Liya Pat RN  6/13/2024  1:47 PM CDT Back to Top      Patient contacted (name and date of birth verified). Provider's results and recommendations reviewed with patient. Patient verbalizes understanding of the information, agrees with plan of care and offers no further questions at this time.    Patrick Castillo MD  6/13/2024  8:37 AM CDT       Please call patient, needs titration study     Patient Communication     Add Comments   Seen Back to Top        Collection Information      General sleep study: Result Notes     Liya Pat RN  6/13/2024  1:47 PM CDT       Patient contacted (name and date of birth verified). Provider's results and recommendations reviewed with patient. Patient verbalizes understanding of the information, agrees with plan of care and offers no further questions at this time.    Patrick Castillo MD  6/13/2024  8:37 AM CDT       Please call patient, needs titration study            Reviewed by Liya Pruitt RN 6/13/2024  1:47 PM   Bettye Brown RN 6/13/2024 10:58 AM   Patrick Castillo MD 6/13/2024  8:37  AM     Reviewed By List    Reviewed By Reviewed On   Liya Pat RN 6/13/2024  1:47 PM   Bettye Brown RN 6/13/2024 10:58 AM   Patrick Castillo MD 6/13/2024  8:37 AM       Routing History    Priority Sent On From To Message Type    6/13/2024 10:58 AM Bettye Brown RN Casas, Diana, RN Result Notes    6/13/2024  8:37 AM Patrick Castillo MD P Em Rn Triage Result Notes    6/12/2024  4:20 PM Interface, Emg Tx In Patrick Castillo MD Results       View Smart[employer] Info    General sleep study (Order #[id number]) on 6/10/24       Printable Version (excludes result notes)    General sleep study (Order #[id number]) on 6/10/24         Transcription    Type ID Date and Time Dictating Provider   Sleep Study [id number] 6/12/2024  3:30 PM Luis Alfredo Hoyos MD   Signed by Luis Alfredo Hoyos MD on 06/12/24 at [address] [place] [address]  Accredited by the American Academy of Sleep Medicine (AASM)            PATIENT'S NAME: RAVINDER ABBOTT    ATTENDING PHYSICIAN: Patrick Castillo MD    REFERRING PHYSICIAN: Patrick Castillo MD    PATIENT ACCOUNT #: [id number] LOCATION: [facility]    MEDICAL RECORD #: [id number] YOB: 1990    DATE OF STUDY: 06/10/2024           SLEEP STUDY REPORT     STUDY TYPE:  Home sleep test.     INDICATION:  Suspected obstructive sleep apnea (ICD-10 code G47.33), in a patient with witnessed apneic events, snoring, nocturnal awakenings, excessive daytime somnolence, migraine headaches, obesity with body mass index of 36.3, and an [place] Sleepiness Scale score of 14/24.     RESULTS:  The patient underwent home sleep test with measurement of her nasal airflow, nasal air pressure, snoring, chest and abdominal wall motion, oximetry, and body position.  I have reviewed the entirety of the raw data of this study.  During the study, the total recording time is 464 minutes.  The lights-out clock time is 9:41 p.m.; the lights-on clock time is 5:26 a.m.  The apnea plus hypopnea  index is 19.7 events per hour.  The supine apnea plus hypopnea index is 27.3 events per hour.  The average oxygen saturation is 96%, the lowest oxygen saturation is 79%, and the patient spent 0.7% of the test with saturations 88% or less.  The average heart rate is 72 beats per minute.  The patient spent approximately 50% of the test in the supine position.     INTERPRETATION:  The data generated from this study is consistent with moderately severe obstructive sleep apnea which is worse in the supine position (ICD-10 code G47.33).     RECOMMENDATIONS:    1.       CPAP titration.    2.       Weight loss.    3.       Avoid alcohol.   4.       Avoid sedating drug.    5.       Patient should not drive if at all sleepy.       Please do not hesitate to contact me if there is any question whatsoever regarding interpretation of this study.     Dictated By Sharyn Hoyos MD  d:06/12/2024 15:30:23           [facility]   Accredited by the American Academy of Sleep Medicine   [address] [place] [address]   Phone: [phone] Fax: [phone]   CPAP TITRATION STUDY   Patient Name:  RAVINDER CORONADO  Study Date:  6/21/2024    Sex:  Female  Referring Physician:  TANVIR CROCKETT    YOB: 1990  Interpreting Physician:  SHARYN HOYOS    Height (inches):  5' 3\"  Study Type:  CPAP Titration    Weight:  205.0 lbs  Neck Circumference:  15    BMI:  36.3  [place]:  14/24    CSN Number:  [id number]  Medical Record Number:  [id number]       Study Type: CPAP Titration   Procedure: The CPAP Titration was performed with a sleep technologist in attendance at the [facility]. The following parameters were monitored: central, occipital and temporal EEG, EOG, submentalis EMG, nasal flow, nasal pressure, respiratory inductance plethysmography, snore microphone, oxygen saturation via continuous pulse oximetry, with an additional channel for measurement of  CPAP flow for the titration of positive airway pressure. Sleep stages, periodic limb movements and EEG arousals were scored in accordance with the American Academy of Sleep Medicine Manual for the Scoring of Sleep and Associated Events. Apnea Hypopnea Index was calculated using the recommended definition of hypopnea for scoring respiratory events. Data acquisition, collection and scoring have been validated and clinically correlated.   Sleep History: RAVINDER CORONADO is a 33 year old Female referred by TANVIR CROCKETT for a CPAP titration study. A previous sleep study was performed on 6/10/24, revealed moderate obstructive sleep apnea with an overall AHI of 19.7, supine AHI of 27.3.   Past Medical History is pertinent for Anxiety, Asthma, Hypertension.   At the time of the study, the patient was prescribed the following medications: Topiramate, Sumatriptan, Olmesartan Medoxomil-HCTZ.   The patient was studied from 09:28:19 PM to 04:42:28 AM, with a total recording time of 434.2, total sleep time of 409.5 and sleep efficiency of 94.3%. Wake after sleep onset was 24.0 minutes. The percentage of total sleep time by sleep stage is as follows: Stage 1 2.1%, Stage 2 71.1%, Stage 3 10.6% and Stage REM 16.2%.   Respiratory Analysis: Monitoring revealed resolution of respiratory events with CPAP at 10 cm H2O. Supine REM was not observed at the final pressure. The Apnea/Hypopnea index (AHI) at the final pressure setting was 1.4. The central sleep apnea index was 0. The oxygen otis was 90.0% and the patient spent 0% of sleep time spent with oxygen levels below 88%.   Snoring Profile: Snoring was resolved at the final pressure setting.   Cardiac Profile: Electrocardiogram demonstrated normal sinus rhythm.   EEG Profile: There was no evidence of epiliptiform activity during sleep. There were 18 spontaneous arousals, with a total arousal index of 5.2.   Periodic Limb Movements: PLM index of 0. PLM Arousal Index of 0.    IMPRESSIONS: This study demonstrates successful CPAP titration.   Diagnosis: Obstructive Sleep Apnea G47.33   RECOMMENDATIONS:   1. The patient should be prescribed CPAP at 10 cm H2O, with humidity at 3 and EPR on.   2. The patient was fitted with a Duque & Paykel Craig mask, size Small.   3. The patient should avoid alcohol and sedative medications, as these may worsen severity of symptoms.   4. The patient should avoid drowsy driving.   5. Patient may follow up with the referring provider.     Thank you for allowing me to participate in this patient's care. Please do not hesitate to contact me with any additional questions.   Electronically signed by Luis Alfredo Hoyos MD   (Electronic Signature)   Board Certified in Sleep Medicine                                                                   Patrick Castillo MD   Physician  Specialty: Family Medicine     Progress Notes     Signed     Encounter Date: 5/8/2024     Signed       Expand All Collapse All    5/8/2024  10:01 AM     Leonela Sy is a 33 year old female.     Chief complaint(s):       Chief Complaint   Patient presents with    Test Results    Dizziness    Weight Problem      HPI:      Leonela Sy primary complaint is regarding Headache, snoring .      Concerning headache,  Leonela Sy was diagnosed with migraine headaches more than 1yrs ago.  She has had prior headaches similar to this one.  Typical precipitating factors include lack of sleep,  and skipping meals.  The current headache began 1 month ago.    Description of Headache:  The location is primarily on bi temporal.  It does not radiate.  She characterizes the headache as moderate in severity; characterized as  throbbing, pounding, a sensation \"like my head will explode\", and \"sinus pressure\".  Associated symptoms include + hemiplegia, nausea, phonophobia, photophobia, rhinorrhea, vision disturbance ( scotoma, visual distortion  with wavy lines, photopsia -visual flashes of light ) and vomiting.  Compliance with treatment has been fair .  Pertinent past medical history includes hypertension. She denies a history of +/- sleep apnea, never been tested.    + snoring, frequent awakening and day time drowsy.   Current Use of Meds to Treat Headaches:  Abortive meds used for this headache: acetaminophen  Prophylactic meds used in general: none     Previous brain imaging study include none .           HISTORY:  Past Medical History       Past Medical History:    Anxiety    Asthma (HCC)    High blood pressure    Visual impairment     wears eye glasses         Past Surgical History         Past Surgical History:   Procedure Laterality Date       x2     x3    Cholecystectomy        Laparoscopic cholecystectomy   2017         Family History         Family History   Problem Relation Age of Onset    Diabetes Mother      Hypertension Mother      Obesity Mother      Other (dm) Mother      Other (htn) Mother      Hypertension Father      Asthma Father      Diabetes Father      Other (htn) Father      Colon Cancer Maternal Grandmother      Cancer Maternal Grandmother           uterine    Hypertension Maternal Grandmother      Diabetes Paternal Grandmother      Diabetes Paternal Grandfather           Social History:   Short Social Hx on File   Social History            Socioeconomic History    Marital status:    Tobacco Use    Smoking status: Former       Types: Cigarettes    Smokeless tobacco: Never    Tobacco comments:       socially when she was younger x 1 month   Vaping Use    Vaping status: Never Used   Substance and Sexual Activity    Alcohol use: Not Currently       Comment: occasional    Drug use: Never    Sexual activity: Yes       Birth control/protection: Condom      Social Determinants of Health        Received from [facility]            Immunizations:        Immunization History   Administered Date(s)  Administered    Covid-19 Vaccine Pfizer 30 mcg/0.3 ml 12/16/2021    FLULAVAL 6 months & older 0.5 ml Prefilled syringe (24286) 01/21/2021, 03/03/2022    FLUZONE 6 months and older PFS 0.5 ml (11409) 09/25/2023    TDAP 06/03/2019, 04/09/2021, 07/26/2022         Medications (Active prior to today's visit):  Current Medications          Current Outpatient Medications   Medication Sig Dispense Refill    topiramate 25 MG Oral Tab Take 1 tablet (25 mg total) by mouth 2 (two) times daily. 60 tablet 2    SUMAtriptan (IMITREX) 50 MG Oral Tab Take 1 tablet (50 mg total) by mouth Q24H PRN for Migraine. Use at onset; repeat once after 2 HRS-ONLY 2 IN 24 HR MAX 9 tablet 1    Olmesartan Medoxomil-HCTZ 20-12.5 MG Oral Tab Take 1 tablet by mouth daily. 90 tablet 1            Allergies:  Allergies   No Known Allergies      ROS:   Review of Systems   Constitutional:  Negative for appetite change and fever.   Eyes:  Negative for visual disturbance.   Respiratory:  Positive for apnea. Negative for shortness of breath.    Cardiovascular:  Negative for chest pain.   Gastrointestinal:  Positive for nausea and vomiting. Negative for abdominal pain.   Musculoskeletal:  Negative for back pain.   Skin:  Negative for rash.   Neurological:  Positive for dizziness and headaches.   Psychiatric/Behavioral:  Positive for sleep disturbance.          PHYSICAL EXAM:   VS: /82   Pulse 92   Ht 5' 3\" (1.6 m)   Wt 205 lb (93 kg)   LMP 02/02/2024 (Approximate)   BMI 36.31 kg/m²      Physical Exam  Vitals reviewed.   Constitutional:       General: She is not in acute distress.     Appearance: Normal appearance.   HENT:      Head: Normocephalic.   Eyes:      Conjunctiva/sclera: Conjunctivae normal.   Cardiovascular:      Rate and Rhythm: Normal rate.   Pulmonary:      Effort: Pulmonary effort is normal.   Musculoskeletal:      Cervical back: Neck supple.   Skin:     Findings: No rash.   Psychiatric:         Mood and Affect: Mood normal.           LABORATORY RESULTS:   No results found for: \"URCOLOR\", \"URCLA\", \"URINELEUK\", \"URINENITRITE\", \"URINEBLOOD\"         Results for orders placed or performed in visit on 05/01/24   Lipid Panel   Result Value Ref Range     Cholesterol, Total 148 <200 mg/dL     HDL Cholesterol 51 40 - 59 mg/dL     Triglycerides 100 30 - 149 mg/dL     LDL Cholesterol 79 <100 mg/dL     VLDL 16 0 - 30 mg/dL     Non HDL Chol 97 <130 mg/dL     Patient Fasting for Lipid? Yes     Hemoglobin A1C   Result Value Ref Range     HgbA1C 5.2 <5.7 %     Estimated Average Glucose 103 68 - 126 mg/dL   Comp Metabolic Panel (14)   Result Value Ref Range     Glucose 93 70 - 99 mg/dL     Sodium 137 136 - 145 mmol/L     Potassium 3.6 3.5 - 5.1 mmol/L     Chloride 111 98 - 112 mmol/L     CO2 26.0 21.0 - 32.0 mmol/L     Anion Gap 0 0 - 18 mmol/L     BUN 10 9 - 23 mg/dL     Creatinine 0.73 0.55 - 1.02 mg/dL     BUN/CREA Ratio 13.7 10.0 - 20.0     Calcium, Total 8.9 8.7 - 10.4 mg/dL     Calculated Osmolality 283 275 - 295 mOsm/kg     eGFR-Cr 111 >=60 mL/min/1.73m2     ALT 26 10 - 49 U/L     AST 20 <=34 U/L     Alkaline Phosphatase 68 37 - 98 U/L     Bilirubin, Total 0.4 0.3 - 1.2 mg/dL     Total Protein 7.3 5.7 - 8.2 g/dL     Albumin 4.3 3.2 - 4.8 g/dL     Globulin  3.0 2.8 - 4.4 g/dL     A/G Ratio 1.4 1.0 - 2.0     Patient Fasting for CMP? Yes         EKG / Spirometry : -      Radiology: No results found.      ASSESSMENT/PLAN:      Assessment       Encounter Diagnoses   Name Primary?    Migraine without aura and without status migrainosus, not intractable Yes    Sleep apnea, unspecified type           1. Migraine without aura and without status migrainosus, not intractable      Headache  MEDICATIONS:   Current Medications     topiramate 25 MG Oral Tab, Take 1 tablet (25 mg total) by mouth 2 (two) times daily., Disp: 60 tablet, Rfl: 2    SUMAtriptan (IMITREX) 50 MG Oral Tab, Take 1 tablet (50 mg total) by mouth Q24H PRN for Migraine. Use at onset; repeat once  after 2 HRS-ONLY 2 IN 24 HR MAX, Disp: 9 tablet, Rfl: 1    Olmesartan Medoxomil-HCTZ 20-12.5 MG Oral Tab, Take 1 tablet by mouth daily., Disp: 90 tablet, Rfl: 1                                 Refills Given today:    Requested Prescriptions             Signed Prescriptions Disp Refills    topiramate 25 MG Oral Tab 60 tablet 2       Sig: Take 1 tablet (25 mg total) by mouth 2 (two) times daily.    SUMAtriptan (IMITREX) 50 MG Oral Tab 9 tablet 1       Sig: Take 1 tablet (50 mg total) by mouth Q24H PRN for Migraine. Use at onset; repeat once after 2 HRS-ONLY 2 IN 24 HR MAX   .    REFERRALS: [facility] REFERRAL HOME SLEEP APNEA TEST,           Procedures    Home Sleep Apnea Test (Adult pt only) - Sleep consult required for Medicare pts   .  RECOMMENDATIONS given include: increase oral fluid intake, modify diet to avoid known headache triggers, and maintain normal sleep patterns. Advised to keep a headache diary.  Counseled regarding lifestyle modifications.  Further diagnostic evaluation per orders.  Medication changes per orders.,  sleep hygiene, avoid caffeine .  FOLLOW-UP:  Instructed to call if she develops new or worsening symptoms, including worsening intensity and confusion.   Schedule a follow-up appointment in 6 weeks.        2. Sleep apnea, unspecified type     Weight loss plan  Referral:  - Home Sleep Apnea Test (Adult pt only) - Sleep consult required for Medicare pts  - General sleep study; Future   Follow up [facility]      Orders This Visit:  No orders of the defined types were placed in this encounter.      Meds This Visit:  Requested Prescriptions             Signed Prescriptions Disp Refills    topiramate 25 MG Oral Tab 60 tablet 2       Sig: Take 1 tablet (25 mg total) by mouth 2 (two) times daily.    SUMAtriptan (IMITREX) 50 MG Oral Tab 9 tablet 1       Sig: Take 1 tablet (50 mg total) by mouth Q24H PRN for Migraine. Use at onset; repeat once after 2 HRS-ONLY 2 IN 24 HR MAX       Imaging & Referrals:  [facility] REFERRAL HOME SLEEP APNEA TEST       TANVIR CROCKETT MD            Electronically signed by Tanvir Crockett MD at 5/8/2024 10:28 AM

## 2023-11-07 ENCOUNTER — TELEPHONE (OUTPATIENT)
Dept: OBGYN CLINIC | Facility: CLINIC | Age: 33
End: 2023-11-07

## 2023-11-07 ENCOUNTER — OFFICE VISIT (OUTPATIENT)
Dept: OBGYN CLINIC | Facility: CLINIC | Age: 33
End: 2023-11-07

## 2023-11-07 VITALS
HEIGHT: 63 IN | DIASTOLIC BLOOD PRESSURE: 68 MMHG | SYSTOLIC BLOOD PRESSURE: 114 MMHG | BODY MASS INDEX: 35.26 KG/M2 | WEIGHT: 199 LBS

## 2023-11-07 DIAGNOSIS — Z01.419 ENCOUNTER FOR GYNECOLOGICAL EXAMINATION WITHOUT ABNORMAL FINDING: Primary | ICD-10-CM

## 2023-11-07 DIAGNOSIS — Z30.2 REQUEST FOR STERILIZATION: Primary | ICD-10-CM

## 2023-11-07 DIAGNOSIS — Z30.09 STERILIZATION CONSULT: ICD-10-CM

## 2023-11-07 PROCEDURE — 3074F SYST BP LT 130 MM HG: CPT | Performed by: OBSTETRICS & GYNECOLOGY

## 2023-11-07 PROCEDURE — 99395 PREV VISIT EST AGE 18-39: CPT | Performed by: OBSTETRICS & GYNECOLOGY

## 2023-11-07 PROCEDURE — 99213 OFFICE O/P EST LOW 20 MIN: CPT | Performed by: OBSTETRICS & GYNECOLOGY

## 2023-11-07 PROCEDURE — 3078F DIAST BP <80 MM HG: CPT | Performed by: OBSTETRICS & GYNECOLOGY

## 2023-11-07 PROCEDURE — 3008F BODY MASS INDEX DOCD: CPT | Performed by: OBSTETRICS & GYNECOLOGY

## 2023-11-07 NOTE — TELEPHONE ENCOUNTER
Please schedule the following surgery:    Procedure: operative laparoscopy/bilateral salpingectomy     Assist: (Y/N or none) request shorty jose manuel. Date: coordinate with pt  Dx: voluntary permanent sterilization. Pre-op appt: (Y/N or n/a)  Admission type: (IN/OUT/OBVS) outpt  Department of discharge(SDS/Floor):  Expected length of stay: outpt  Procedure length time (please enter amount you are requesting): 1 hour  Recovery time (patients always ask):  Medical Clearance: (Y/N) no      ALL Medicaid/including BCBS community: Tubal/ Hyst form MUST be signed (30 days):     COVID19 Lab 5062 needs to be placed for all C-sections, IOL & Versions     Message to nurses:

## 2023-11-08 LAB — HPV I/H RISK 1 DNA SPEC QL NAA+PROBE: NEGATIVE

## 2023-11-09 NOTE — TELEPHONE ENCOUNTER
Called and spoke to pt using  ID# [id number]. Pt informed that OR has availability on 12/6 @ 2pm. Pt agrees with time/date.  Minor case letter sent to pt's mychart    -assist pending  -HMO/routing to [facility]

## 2023-11-09 NOTE — TELEPHONE ENCOUNTER
Called and spoke to pt. Pt desires soonest available. I informed pt of limited availability with being the end of the year. I advised pt that I will check dates and return her call with a date/time. Pt verbalized understanding.

## 2023-11-20 RX ORDER — OLMESARTAN MEDOXOMIL AND HYDROCHLOROTHIAZIDE 20/12.5 20; 12.5 MG/1; MG/1
1 TABLET ORAL DAILY
Qty: 90 TABLET | Refills: 1 | Status: SHIPPED | OUTPATIENT
Start: 2023-11-20 | End: 2024-11-14

## 2023-11-21 NOTE — TELEPHONE ENCOUNTER
Refill passed per [facility] protocol - however pop up warning seen  Rx Pended, authorize if appropriate      Requested Prescriptions   Pending Prescriptions Disp Refills    Olmesartan Medoxomil-HCTZ 20-12.5 MG Oral Tab [Pharmacy Med Name: OLMESARTAN MEDOX/HCTZ 20-12.5MG TAB] 90 tablet 3     Sig: Take 1 tablet by mouth daily.        Hypertensive Medications Protocol Passed - 11/19/2023  5:54 AM        Passed - In person appointment in the past 12 or next 3 months     Recent Outpatient Visits              1 week ago Encounter for gynecological examination without abnormal finding    [facility], [address] [place] [address], Marlen Gonzalez MD    Office Visit    1 month ago Primary hypertension    [facility], [facility] [address] [address], DO    Office Visit    2 months ago Primary hypertension    [facility], [facility], Tashi Norris MD    Office Visit    3 months ago Primary hypertension    [facility], [address], [place], Mary Brumfield, DO    Office Visit    3 months ago Primary hypertension    Doc Espinoza MD    Office Visit                      Passed - Last BP reading less than 140/90     BP Readings from Last 1 Encounters:   11/07/23 114/68               Passed - CMP or BMP in past 6 months     Recent Results (from the past 4392 hour(s))   Comp Metabolic Panel (14)    Collection Time: 09/25/23 11:30 AM   Result Value Ref Range    Glucose 99 70 - 99 mg/dL    Sodium 139 136 - 145 mmol/L    Potassium 3.7 3.5 - 5.1 mmol/L    Chloride 105 98 - 112 mmol/L    CO2 30.0 21.0 - 32.0 mmol/L    Anion Gap 4 0 - 18 mmol/L    BUN 15 7 - 18 mg/dL    Creatinine 0.81 0.55 - 1.02 mg/dL    Calcium, Total 9.0 8.5 - 10.1 mg/dL    Calculated Osmolality 289 275 - 295 mOsm/kg    eGFR-Cr 99 >=60 mL/min/1.73m2    AST 15 15 - 37 U/L    ALT 31 13 - 56 U/L    Alkaline Phosphatase 81 37 - 98 U/L    Bilirubin, Total 0.2 0.1 - 2.0 mg/dL    Total Protein 7.8 6.4 - 8.2 g/dL    Albumin 3.5 3.4 - 5.0 g/dL    Globulin  4.3 2.8 - 4.4 g/dL    A/G Ratio 0.8 (L) 1.0 - 2.0    Patient Fasting for CMP? No      *Note: Due to a large number of results and/or encounters for the requested time period, some results have not been displayed. A complete set of results can be found in Results Review.                Passed - In person appointment or virtual visit in the past 6 months     Recent Outpatient Visits              1 week ago Encounter for gynecological examination without abnormal finding    [facility], Anjel Packer MD    Office Visit    1 month ago Primary hypertension    [facility], [address], [address], [place], DO    Office Visit    2 months ago Primary hypertension    [facility], Rica Hill MD    Office Visit    3 months ago Primary hypertension    [facility], [facility], [place], Trenna Lenis, DO    Office Visit    3 months ago Primary hypertension    [facility], Rica Hill MD    Office Visit                      Passed [facility] or GFRNAA > 50     GFR Evaluation  EGFRCR: 99 , resulted on 9/25/2023

## 2023-11-30 RX ORDER — ACETAMINOPHEN 500 MG
500 TABLET ORAL EVERY 6 HOURS PRN
COMMUNITY

## 2023-12-06 ENCOUNTER — ANESTHESIA EVENT (OUTPATIENT)
Dept: SURGERY | Facility: HOSPITAL | Age: 33
End: 2023-12-06
Payer: COMMERCIAL

## 2023-12-06 ENCOUNTER — TELEPHONE (OUTPATIENT)
Dept: OBGYN CLINIC | Facility: CLINIC | Age: 33
End: 2023-12-06

## 2023-12-06 ENCOUNTER — ANESTHESIA (OUTPATIENT)
Dept: SURGERY | Facility: HOSPITAL | Age: 33
End: 2023-12-06
Payer: COMMERCIAL

## 2023-12-06 ENCOUNTER — HOSPITAL ENCOUNTER (OUTPATIENT)
Facility: HOSPITAL | Age: 33
Setting detail: HOSPITAL OUTPATIENT SURGERY
Discharge: HOME | End: 2023-12-06
Attending: OBSTETRICS & GYNECOLOGY | Admitting: OBSTETRICS & GYNECOLOGY
Payer: COMMERCIAL

## 2023-12-06 VITALS
HEART RATE: 75 BPM | DIASTOLIC BLOOD PRESSURE: 86 MMHG | OXYGEN SATURATION: 97 % | TEMPERATURE: 97 F | BODY MASS INDEX: 35.44 KG/M2 | SYSTOLIC BLOOD PRESSURE: 134 MMHG | WEIGHT: 200 LBS | RESPIRATION RATE: 19 BRPM | HEIGHT: 63 IN

## 2023-12-06 DIAGNOSIS — Z30.2 REQUEST FOR STERILIZATION: ICD-10-CM

## 2023-12-06 LAB — B-HCG UR QL: NEGATIVE

## 2023-12-06 PROCEDURE — 49322 LAPAROSCOPY ASPIRATION: CPT | Performed by: OBSTETRICS & GYNECOLOGY

## 2023-12-06 PROCEDURE — 0U914ZX DRAINAGE OF LEFT OVARY, PERCUTANEOUS ENDOSCOPIC APPROACH, DIAGNOSTIC: ICD-10-PCS | Performed by: OBSTETRICS & GYNECOLOGY

## 2023-12-06 PROCEDURE — 0UB74ZZ EXCISION OF BILATERAL FALLOPIAN TUBES, PERCUTANEOUS ENDOSCOPIC APPROACH: ICD-10-PCS | Performed by: OBSTETRICS & GYNECOLOGY

## 2023-12-06 PROCEDURE — 58661 LAPAROSCOPY REMOVE ADNEXA: CPT | Performed by: OBSTETRICS & GYNECOLOGY

## 2023-12-06 RX ORDER — HYDROMORPHONE HYDROCHLORIDE 1 MG/ML
0.2 INJECTION, SOLUTION INTRAMUSCULAR; INTRAVENOUS; SUBCUTANEOUS EVERY 5 MIN PRN
Status: DISCONTINUED | OUTPATIENT
Start: 2023-12-06 | End: 2023-12-06

## 2023-12-06 RX ORDER — METOCLOPRAMIDE HYDROCHLORIDE 5 MG/ML
10 INJECTION INTRAMUSCULAR; INTRAVENOUS ONCE
Status: COMPLETED | OUTPATIENT
Start: 2023-12-06 | End: 2023-12-06

## 2023-12-06 RX ORDER — ACETAMINOPHEN 500 MG
1000 TABLET ORAL ONCE
Status: COMPLETED | OUTPATIENT
Start: 2023-12-06 | End: 2023-12-06

## 2023-12-06 RX ORDER — MORPHINE SULFATE 4 MG/ML
2 INJECTION, SOLUTION INTRAMUSCULAR; INTRAVENOUS EVERY 10 MIN PRN
Status: DISCONTINUED | OUTPATIENT
Start: 2023-12-06 | End: 2023-12-06

## 2023-12-06 RX ORDER — HYDROMORPHONE HYDROCHLORIDE 1 MG/ML
0.4 INJECTION, SOLUTION INTRAMUSCULAR; INTRAVENOUS; SUBCUTANEOUS EVERY 5 MIN PRN
Status: DISCONTINUED | OUTPATIENT
Start: 2023-12-06 | End: 2023-12-06

## 2023-12-06 RX ORDER — MAGNESIUM SULFATE HEPTAHYDRATE 500 MG/ML
INJECTION, SOLUTION INTRAMUSCULAR; INTRAVENOUS AS NEEDED
Status: DISCONTINUED | OUTPATIENT
Start: 2023-12-06 | End: 2023-12-06 | Stop reason: SURG

## 2023-12-06 RX ORDER — ROCURONIUM BROMIDE 10 MG/ML
INJECTION, SOLUTION INTRAVENOUS AS NEEDED
Status: DISCONTINUED | OUTPATIENT
Start: 2023-12-06 | End: 2023-12-06 | Stop reason: SURG

## 2023-12-06 RX ORDER — BUPIVACAINE HYDROCHLORIDE 2.5 MG/ML
INJECTION, SOLUTION EPIDURAL; INFILTRATION; INTRACAUDAL AS NEEDED
Status: DISCONTINUED | OUTPATIENT
Start: 2023-12-06 | End: 2023-12-06 | Stop reason: HOSPADM

## 2023-12-06 RX ORDER — FAMOTIDINE 20 MG/1
20 TABLET, FILM COATED ORAL ONCE
Status: COMPLETED | OUTPATIENT
Start: 2023-12-06 | End: 2023-12-06

## 2023-12-06 RX ORDER — MORPHINE SULFATE 4 MG/ML
4 INJECTION, SOLUTION INTRAMUSCULAR; INTRAVENOUS EVERY 10 MIN PRN
Status: DISCONTINUED | OUTPATIENT
Start: 2023-12-06 | End: 2023-12-06

## 2023-12-06 RX ORDER — LIDOCAINE HYDROCHLORIDE 10 MG/ML
INJECTION, SOLUTION EPIDURAL; INFILTRATION; INTRACAUDAL; PERINEURAL AS NEEDED
Status: DISCONTINUED | OUTPATIENT
Start: 2023-12-06 | End: 2023-12-06 | Stop reason: SURG

## 2023-12-06 RX ORDER — HYDROMORPHONE HYDROCHLORIDE 1 MG/ML
0.6 INJECTION, SOLUTION INTRAMUSCULAR; INTRAVENOUS; SUBCUTANEOUS EVERY 5 MIN PRN
Status: DISCONTINUED | OUTPATIENT
Start: 2023-12-06 | End: 2023-12-06

## 2023-12-06 RX ORDER — ONDANSETRON 2 MG/ML
INJECTION INTRAMUSCULAR; INTRAVENOUS AS NEEDED
Status: DISCONTINUED | OUTPATIENT
Start: 2023-12-06 | End: 2023-12-06 | Stop reason: SURG

## 2023-12-06 RX ORDER — SODIUM CHLORIDE, SODIUM LACTATE, POTASSIUM CHLORIDE, CALCIUM CHLORIDE 600; 310; 30; 20 MG/100ML; MG/100ML; MG/100ML; MG/100ML
INJECTION, SOLUTION INTRAVENOUS CONTINUOUS
Status: DISCONTINUED | OUTPATIENT
Start: 2023-12-06 | End: 2023-12-06

## 2023-12-06 RX ORDER — NALOXONE HYDROCHLORIDE 0.4 MG/ML
80 INJECTION, SOLUTION INTRAMUSCULAR; INTRAVENOUS; SUBCUTANEOUS AS NEEDED
Status: DISCONTINUED | OUTPATIENT
Start: 2023-12-06 | End: 2023-12-06

## 2023-12-06 RX ORDER — MORPHINE SULFATE 10 MG/ML
6 INJECTION, SOLUTION INTRAMUSCULAR; INTRAVENOUS EVERY 10 MIN PRN
Status: DISCONTINUED | OUTPATIENT
Start: 2023-12-06 | End: 2023-12-06

## 2023-12-06 RX ORDER — KETAMINE HYDROCHLORIDE 50 MG/ML
INJECTION, SOLUTION, CONCENTRATE INTRAMUSCULAR; INTRAVENOUS AS NEEDED
Status: DISCONTINUED | OUTPATIENT
Start: 2023-12-06 | End: 2023-12-06 | Stop reason: SURG

## 2023-12-06 RX ORDER — FAMOTIDINE 10 MG/ML
20 INJECTION, SOLUTION INTRAVENOUS ONCE
Status: COMPLETED | OUTPATIENT
Start: 2023-12-06 | End: 2023-12-06

## 2023-12-06 RX ORDER — DEXAMETHASONE SODIUM PHOSPHATE 4 MG/ML
VIAL (ML) INJECTION AS NEEDED
Status: DISCONTINUED | OUTPATIENT
Start: 2023-12-06 | End: 2023-12-06 | Stop reason: SURG

## 2023-12-06 RX ORDER — METOCLOPRAMIDE 10 MG/1
10 TABLET ORAL ONCE
Status: COMPLETED | OUTPATIENT
Start: 2023-12-06 | End: 2023-12-06

## 2023-12-06 RX ADMIN — SODIUM CHLORIDE, SODIUM LACTATE, POTASSIUM CHLORIDE, CALCIUM CHLORIDE: 600; 310; 30; 20 INJECTION, SOLUTION INTRAVENOUS at 13:13:00

## 2023-12-06 RX ADMIN — SODIUM CHLORIDE, SODIUM LACTATE, POTASSIUM CHLORIDE, CALCIUM CHLORIDE: 600; 310; 30; 20 INJECTION, SOLUTION INTRAVENOUS at 12:33:00

## 2023-12-06 RX ADMIN — SODIUM CHLORIDE, SODIUM LACTATE, POTASSIUM CHLORIDE, CALCIUM CHLORIDE: 600; 310; 30; 20 INJECTION, SOLUTION INTRAVENOUS at 12:47:00

## 2023-12-06 RX ADMIN — SODIUM CHLORIDE, SODIUM LACTATE, POTASSIUM CHLORIDE, CALCIUM CHLORIDE: 600; 310; 30; 20 INJECTION, SOLUTION INTRAVENOUS at 12:57:00

## 2023-12-06 RX ADMIN — MAGNESIUM SULFATE HEPTAHYDRATE 2 G: 500 INJECTION, SOLUTION INTRAMUSCULAR; INTRAVENOUS at 12:33:00

## 2023-12-06 RX ADMIN — ONDANSETRON 4 MG: 2 INJECTION INTRAMUSCULAR; INTRAVENOUS at 13:13:00

## 2023-12-06 RX ADMIN — KETAMINE HYDROCHLORIDE 50 MG: 50 INJECTION, SOLUTION, CONCENTRATE INTRAMUSCULAR; INTRAVENOUS at 12:59:00

## 2023-12-06 RX ADMIN — LIDOCAINE HYDROCHLORIDE 50 MG: 10 INJECTION, SOLUTION EPIDURAL; INFILTRATION; INTRACAUDAL; PERINEURAL at 12:40:00

## 2023-12-06 RX ADMIN — LIDOCAINE HYDROCHLORIDE 50 MG: 10 INJECTION, SOLUTION EPIDURAL; INFILTRATION; INTRACAUDAL; PERINEURAL at 12:38:00

## 2023-12-06 RX ADMIN — ROCURONIUM BROMIDE 50 MG: 10 INJECTION, SOLUTION INTRAVENOUS at 12:39:00

## 2023-12-06 RX ADMIN — DEXAMETHASONE SODIUM PHOSPHATE 8 MG: 4 MG/ML VIAL (ML) INJECTION at 12:41:00

## 2023-12-06 NOTE — TELEPHONE ENCOUNTER
[place] speaking Pt had surgery today needs note for work with possible return date , Pt also asking when to do post op

## 2023-12-06 NOTE — ANESTHESIA PROCEDURE NOTES
Airway  Date/Time: 12/6/2023 12:40 PM  Urgency: Elective    Airway not difficult    General Information and Staff    Patient location during procedure: OR  Anesthesiologist: Myra Elizondo MD  Resident/CRNA: Kerry Gold CRNA  Performed: CRNA   Performed by: Kerry Gold CRNA  Authorized by: Myra Elizondo MD      Indications and Patient Condition  Indications for airway management: anesthesia  Sedation level: deep  Preoxygenated: yes  Patient position: sniffing  Mask difficulty assessment: 2 - vent by mask + OA or adjuvant +/- NMBA (1 mask strap, 80mm OPA.)    Final Airway Details  Final airway type: endotracheal airway      Successful airway: ETT  Cuffed: yes   Successful intubation technique: direct laryngoscopy  Facilitating devices/methods: intubating stylet  Endotracheal tube insertion site: oral  Blade: Fani  Blade size: #3    Cormack-Lehane Classification: grade IIA - partial view of glottis (POGO 75)  Placement verified by: capnometry   Cuff volume (mL): 6  Measured from: teeth  ETT to teeth (cm): 21  Number of attempts at approach: 1    Additional Comments  Atraumatic insertion, dentition and soft structures as preop. Soft bite block inserted between left molars, tongue midline.

## 2023-12-06 NOTE — BRIEF OP NOTE
Pre-Operative Diagnosis: Request for sterilization [Z30.2]     Post-Operative Diagnosis: Request for sterilization [Z30.2]      Procedure Performed:   Operative laparoscopy/bilateral salpingectomy  Adnexal cyst aspiration, lysis of adhesion  Surgeon(s) and Role:     * Joey Ventura MD - Primary    Assistant(s):  Surgical Assistant.: Lucía Bailey     Surgical Findings: omental to ant abd peritoneum with byron  Left adnexal cyst, drained, and fluid sent to pathology     Specimen: cyst fluid,  right tube, left tube     Estimated Blood Loss: Blood Output: 5 mL (12/6/2023  1:21 PM)      Dictation Number:  pending    Anthony Callejas MD  12/6/2023  1:28 PM

## 2023-12-06 NOTE — TELEPHONE ENCOUNTER
Please ask pt when she wants to return to work, reasonable to return as soon as Monday.    May have postop next week

## 2023-12-07 NOTE — TELEPHONE ENCOUNTER
Patient verified name and     Patient okay with returning to work on Monday. Letter generated and sent via [address] Patient scheduled for post op 12/15. Aware of scheduling details.

## 2023-12-07 NOTE — OPERATIVE REPORT
[facility]    PATIENT'S NAME: RAVINDER Jefferson   ATTENDING PHYSICIAN: Anthony Gregorio MD   OPERATING PHYSICIAN: Darleen Gregorio MD   PATIENT ACCOUNT#:   [de-identified]    LOCATION:  [address]  MEDICAL RECORD #:   [id number]       YOB: 1990  ADMISSION DATE:       12/06/2023      OPERATION DATE:  12/06/2023    OPERATIVE REPORT      PREOPERATIVE DIAGNOSIS:  Voluntary permanent sterilization. POSTOPERATIVE DIAGNOSIS:  Voluntary permanent sterilization, aspiration of adnexal cyst plus lysis of adhesion. PROCEDURE:    1. Operative laparoscopy. 2.   Right salpingectomy. 3.   Left salpingectomy. 4.   Aspiration of left adnexal cyst.  5.   Lysis of adhesion. ASSISTANT:  MICHAEL Garcia. ANESTHESIA:  General endotracheal, Dr. Micaela Canavan. ESTIMATED BLOOD LOSS:  Less than 5 mL. COMPLICATIONS:  None. CONDITION:  Patient tolerated the procedure well and was taken [facility] in good condition. INDICATIONS:  The patient is a 51-year-old multiparous female who requested voluntary permanent sterilization was counseled the risks, benefits, and alternatives of procedure including risk of bleeding; infection; damage to internal organs (including bowel, bladder, uterus, ovaries, tubes, cervix, vagina, ureters, blood vessels, among other organs); risk of anesthesia; risk of thromboembolic disease such as DVT, PE, MI, or stroke; risk of pregnancy of less than 1% after bilateral salpingectomy; among other risks. The patient voiced understanding of all above and all questions were answered. FINDINGS:    1. Left adnexal cyst noted and aspirated and drained. 2.   Small omental adhesion noted to the anterior peritoneal wall and lysis of adhesion performed. OPERATIVE TECHNIQUE:  The patient was taken to the operating room and after induction of general anesthesia, the patient was prepped and draped in usual sterile fashion.   A Bass catheter was placed in the bladder for drainage. Sequential compression devices were begun prior to procedure, were operative throughout the procedure, and will be operative postop as well for DVT prophylaxis. Preop pregnancy test was negative as noted above. Sterile speculum was placed in vagina. Single-tooth tenaculum was placed on anterior lip of the cervix. Cervix was gently dilated after which a HUMI uterine manipulator was placed without difficulty. Single-tooth tenaculum was removed. Attention was then turned to the abdomen. A knife was then used to perform a 5 mm umbilical incision through which the Veress needle was introduced in usual fashion. Water drop test revealed an excellent placement of the Veress needle. The abdomen was fully insufflated after which the Veress needle was removed. A 5 mm bladeless disposable trocar was then inserted without difficulty under direct visualization and the abdomen was visualized. Right and left lower quadrants were visualized, and the 5 mm bladeless disposable laparoscopic trocars were placed in the left and right lower quadrant areas. Note that patient was placed in Trendelenburg position at the beginning of the case. Small omental adhesion was noted to the anterior abdominal wall peritoneum and lysis of adhesion with a [place] EnSeal device was performed, continues to the anterior abdominal serosa and good hemostasis noted with lysis of adhesion performed. Left adnexal cyst was noted, and this was drained and sent to Cytology. The left fallopian tube was then elevated and was excised in the usual fashion with the EnSeal bipolar device including the paratubal cyst.    Attention was turned to the right fallopian tube, this was elevated, and identified the fimbria as was the left fallopian tube. The right fallopian tube was then excised in the usual fashion with the EnSeal bipolar device, and this was removed and sent to Pathology as well. This was sent separately. Excellent hemostasis was subsequently noted. All counts were correct. The abdomen was then fully desufflated, and the trocars were removed in the usual fashion. The skin incisions were closed with 4-0 Vicryl suture and followed by Dermabond placed over the closed incisions. The HUMI uterine manipulator was deflated, removed in usual fashion. Excellent hemostasis noted in the vagina. The Bass was removed as well at this time and 200 mL of urine was noted. All counts were correct. The patient tolerated the procedure well and was taken to [facility] in good condition. Dictated By Isadora Newman MD  d: 12/06/2023 18:48:34  t: 12/06/2023 19:01:44  Job [id number]  AAS/

## 2023-12-11 ENCOUNTER — TELEPHONE (OUTPATIENT)
Dept: OBGYN CLINIC | Facility: CLINIC | Age: 33
End: 2023-12-11

## 2023-12-11 NOTE — TELEPHONE ENCOUNTER
Patient verified name and     Patient states she is having some mild incision draining, noticed some blood with clear liquid. Patient has been feeling very dizzy throughout the day, states that she will sit for a while and feel better and then it returns. Onset of dizziness started when she noticed drainage. Offered appt today. Agreed. Scheduled with ASJ.  Aware of scheduling details

## 2023-12-11 NOTE — TELEPHONE ENCOUNTER
Patient had surgery recently. This morning she noticed some bleeding near her belly button/incision site. Please advise.

## 2023-12-12 ENCOUNTER — OFFICE VISIT (OUTPATIENT)
Dept: OBGYN CLINIC | Facility: CLINIC | Age: 33
End: 2023-12-12

## 2023-12-12 ENCOUNTER — LAB ENCOUNTER (OUTPATIENT)
Dept: LAB | Facility: HOSPITAL | Age: 33
End: 2023-12-12
Attending: OBSTETRICS & GYNECOLOGY
Payer: COMMERCIAL

## 2023-12-12 VITALS
WEIGHT: 200 LBS | SYSTOLIC BLOOD PRESSURE: 118 MMHG | BODY MASS INDEX: 35.44 KG/M2 | HEIGHT: 63 IN | DIASTOLIC BLOOD PRESSURE: 68 MMHG

## 2023-12-12 DIAGNOSIS — R42 DIZZINESS: Primary | ICD-10-CM

## 2023-12-12 DIAGNOSIS — T14.8XXA DRAINAGE FROM WOUND: ICD-10-CM

## 2023-12-12 DIAGNOSIS — R42 DIZZINESS: ICD-10-CM

## 2023-12-12 LAB
DEPRECATED RDW RBC AUTO: 37.9 FL (ref 35.1–46.3)
ERYTHROCYTE [DISTWIDTH] IN BLOOD BY AUTOMATED COUNT: 12.2 % (ref 11–15)
HCT VFR BLD AUTO: 38.2 %
HGB BLD-MCNC: 13.2 G/DL
MCH RBC QN AUTO: 29.4 PG (ref 26–34)
MCHC RBC AUTO-ENTMCNC: 34.6 G/DL (ref 31–37)
MCV RBC AUTO: 85.1 FL
PLATELET # BLD AUTO: 326 10(3)UL (ref 150–450)
RBC # BLD AUTO: 4.49 X10(6)UL
WBC # BLD AUTO: 8.3 X10(3) UL (ref 4–11)

## 2023-12-12 PROCEDURE — 3008F BODY MASS INDEX DOCD: CPT | Performed by: OBSTETRICS & GYNECOLOGY

## 2023-12-12 PROCEDURE — 36415 COLL VENOUS BLD VENIPUNCTURE: CPT

## 2023-12-12 PROCEDURE — 3078F DIAST BP <80 MM HG: CPT | Performed by: OBSTETRICS & GYNECOLOGY

## 2023-12-12 PROCEDURE — 85027 COMPLETE CBC AUTOMATED: CPT

## 2023-12-12 PROCEDURE — 99213 OFFICE O/P EST LOW 20 MIN: CPT | Performed by: OBSTETRICS & GYNECOLOGY

## 2023-12-12 PROCEDURE — 3074F SYST BP LT 130 MM HG: CPT | Performed by: OBSTETRICS & GYNECOLOGY

## 2023-12-12 NOTE — PROGRESS NOTES
HPI:   Marti Elliott is a 35year old female who presents for a    1) hx of possible incisional drainage  pt stated had some scant drainage once when her glue came off her umbilical incision but none now. Counseled pt. 2) hx of dizziness:  pt counseled on possible etiologies. Pt to go to lab for cbc to check for anemia as well. All questions answered. Wt Readings from Last 6 Encounters:   23 200 lb (90.7 kg)   23 199 lb (90.3 kg)   23 197 lb (89.4 kg)   23 193 lb 6.4 oz (87.7 kg)   23 189 lb (85.7 kg)   23 186 lb (84.4 kg)     There is no height or weight on file to calculate BMI. Cholesterol, Total (mg/dL)   Date Value   2023 164   2021 156   2019 163     HDL Cholesterol (mg/dL)   Date Value   2023 50   2021 45   2019 76 (H)     LDL Cholesterol (mg/dL)   Date Value   2023 64   2021 76   2019 65     AST (U/L)   Date Value   2023 15   2023 47 (H)   09/15/2022 10 (L)   2018 27   2018 27     ALT (U/L)   Date Value   2023 31   2023 70 (H)   09/15/2022 15   2018 32   2018 37        Current Outpatient Medications   Medication Sig Dispense Refill    acetaminophen 500 MG Oral Tab Take 1 tablet (500 mg total) by mouth every 6 (six) hours as needed for Pain. ALBUTEROL SULFATE IN Inhale 1 puff into the lungs as needed (shortness of breath). Olmesartan Medoxomil-HCTZ 20-12.5 MG Oral Tab Take 1 tablet by mouth daily. (Patient taking differently: Take 1 tablet by mouth every morning.) 90 tablet 1    ergocalciferol 1.25 MG (59562 UT) Oral Cap Take 1 capsule (50,000 Units total) by mouth once a week.         Past Medical History:   Diagnosis Date    Anxiety     Asthma     High blood pressure     Visual impairment     wears eye glasses      Past Surgical History:   Procedure Laterality Date      x2    x3    CHOLECYSTECTOMY      LAPAROSCOPIC CHOLECYSTECTOMY  12/12/2017      Family History   Problem Relation Age of Onset    Diabetes Mother     Hypertension Mother     Obesity Mother     Other (dm) Mother     Other (htn) Mother     Hypertension Father     Asthma Father     Diabetes Father     Other (htn) Father     Colon Cancer Maternal Grandmother     Cancer Maternal Grandmother         uterine    Hypertension Maternal Grandmother     Diabetes Paternal Grandmother     Diabetes Paternal Grandfather       Social History:   Social History     Socioeconomic History    Marital status:    Tobacco Use    Smoking status: Former     Packs/day: 0     Types: Cigarettes    Smokeless tobacco: Never    Tobacco comments:     socially when she was younger x 1 month   Vaping Use    Vaping Use: Never used   Substance and Sexual Activity    Alcohol use: Not Currently     Comment: occasional    Drug use: Never    Sexual activity: Yes     Birth control/protection: Condom            REVIEW OF SYSTEMS:   GENERAL: feels well otherwise  SKIN: denies any unusual skin lesions  EYES:denies blurred vision or double vision  HEENT: denies nasal congestion, sinus pain or ST  LUNGS: denies shortness of breath with exertion  CARDIOVASCULAR: denies chest pain on exertion  GI: denies abdominal pain,denies heartburn  : denies dysuria, vaginal discharge or itching,periods regular   MUSCULOSKELETAL: denies back pain  NEURO: denies headaches  PSYCHE: denies depression or anxiety  HEMATOLOGIC: denies hx of anemia  ENDOCRINE: denies thyroid history  ALL/ASTHMA: denies hx of allergy or asthma    EXAM:   LMP 11/02/2023 (Exact Date)   There is no height or weight on file to calculate BMI.    GENERAL: well developed, well nourished,in no apparent distress  SKIN: no rashes,no suspicious lesions  HEENT: atraumatic, normocephalic  EYES:normal in appearance  NECK: supple,no adenopathy  CHEST: no chest tenderness  LUNGS: clear to auscultation  CARDIO: RRR without murmur  GI: good BS's,no masses, HSM or tenderness,  incisions intact. No infection noted. Pt counseled on use of neosporin and vit e at incision if desired.      MUSCULOSKELETAL: back is not tender,FROM of the back  EXTREMITIES: no cyanosis, clubbing or edema  NEURO: Oriented times three    Collected 5/9/2023 10:25 AM       Status: Final result       Dx: Physical exam    2 Result Notes      Component  Ref Range & Units 5/9/23 10:25 AM   WBC  4.0 - 11.0 x10(3) uL 8.1   RBC  3.80 - 5.30 x10(6)uL 4.59   HGB  12.0 - 16.0 g/dL 13.4   HCT  35.0 - 48.0 % 39.6   MCV  80.0 - 100.0 fL 86.3   MCH  26.0 - 34.0 pg 29.2   MCHC  31.0 - 37.0 g/dL 33.8   RDW-SD  35.1 - 46.3 fL 38.9   RDW  11.0 - 15.0 % 12.4   PLT  150.0 - 450.0 10(3)uL 315.0   Neutrophil Absolute Prelim  1.50 - 7.70 x10 (3) uL 4.16   Neutrophil Absolute  1.50 - 7.70 x10(3) uL 4.16   Lymphocyte Absolute  1.00 - 4.00 x10(3) uL 3.24   Monocyte Absolute  0.10 - 1.00 x10(3) uL 0.40   Eosinophil Absolute  0.00 - 0.70 x10(3) uL 0.18   Basophil Absolute  0.00 - 0.20 x10(3) uL 0.05   Immature Granulocyte Absolute  0.00 - 1.00 x10(3) uL 0.02   Neutrophil %  % 51.8   Lymphocyte %  % 40.2   Monocyte %  % 5.0   Eosinophil %  % 2.2   Basophil %  % 0.6   Immature Granulocyte %  % 0.2   Resulting Agency [facility])              Specimen Collected: 05/09/23 10:25 AM Last Resulted: 05/09/23  1:11 PM             0 Result Notes        Component  Ref Range & Units      Case Report     Surgical Pathology                                Case: [id number]                                     Authorizing Provider:  Lanette Dickinson MD    Collected:           12/06/2023 01:18 PM             Ordering Location[facility]     [facility]          Received:            12/06/2023 01:47 PM                                    Operating Room                                                                 Pathologist:           Alfredo Jaffe MD                                                           Specimens:   A) - Fallopian tube left, 2. LEFT FALLOPIAN TUBE                                                      B) - Fallopian tube right, 3. RIGHT FALLOPIAN TUBE                                            Final Diagnosis:        A. Left fallopian tube, salpingectomy:   Fallopian tube segment, completely transected. B. Right fallopian tube, salpingectomy:   Fallopian tube segment, completely transected. Paratubal cyst.           Electronically signed by Donnajean Severe, MD on 12/7/2023 at  3:31 PM        Clinical Information      Z30.2 Request For Sterilization. Gross Description      A. The specimen is received in formalin labeled \"Madrueno Kerrie, left fallopian tube\" and consists of a completely transected, fimbriated fallopian tube measuring 7.5 x 0.6 cm. The specimen is sectioned to reveal a unremarkable lumen. Representative sections are submitted in one cassette. B. The specimen is received in formalin labeled \"Medrueno Kerrie, right fallopian tube\" and consists of a completely transected, fimbriated fallopian tube measuring 7.4 x 0.6 cm, with a paratubal cyst filled with clear fluid (0.7 cm in greatest dimension). The specimen is sectioned to reveal a unremarkable lumen. Representative sections including cyst are submitted in one cassette. ([facility])     Alfred Villela M.D. Interpretation     Benign     Electronically signed by Donnajean Severe, MD on 12/7/2023 at  3:31 PM     [facility])          ASSESSMENT AND PLAN:   Chichi Shaffer is a 35year old female who presents for a     1) hx of possible incisional drainage  pt stated had some scant drainage once when her glue came off her umbilical incision but none now. Counseled pt. 2) hx of dizziness:  pt counseled on possible etiologies. Pt to go to lab for cbc to check for anemia as well. All questions answered.

## 2024-03-28 ENCOUNTER — NURSE TRIAGE (OUTPATIENT)
Dept: FAMILY MEDICINE CLINIC | Facility: CLINIC | Age: 34
End: 2024-03-28

## 2024-03-28 NOTE — TELEPHONE ENCOUNTER
Action Requested: Summary for Provider     []  Critical Lab, Recommendations Needed  [] Need Additional Advice  [x]   FYI    []   Need Orders  [] Need Medications Sent to Pharmacy  []  Other     SUMMARY: Per protocol disposition advised Go to ED/ICC. This RN advised patient go to ED. Patient states she will go to [facility].    MARLINE Urban.    Triage RN, 3/29/24 ER follow up. Thank you.       Reason for call: Chest Pain  Onset: 1-2 weeks ago    Spoke to patient with assistance from [employer]  Mg ID #[id number] (Name and  of patient verified).  Dizziness, nausea, some chest pain, headache, fatigue  Last time she had chest pain was today that lasted about 30 minutes. Pain travels to left arm. Does not have chest pain or arm pain now  With chest pain, feels heart is beating faster than normal. Has some shortness of breath as well.  Taking blood pressure medications, blood pressure has been normal. Last night blood pressure was 119/80; pulse 100  Denies: current symptoms, visible sweat down face, and protocol questions marked with \"no\"    Reason for Disposition   Chest pain lasting longer than 5 minutes and occurred in last 3 days (72 hours) (Exception: feels exactly the same as previously diagnosed heartburn and has accompanying sour taste in mouth)    Protocols used: Chest Pain-A-[place]

## 2024-03-29 NOTE — TELEPHONE ENCOUNTER
Pt states she did not go the ER.  Pt did not go ER as pain went away and pain only comes and goes.  Pt states she will go to the ER when she is experiencing chest pain again.

## 2024-04-30 ENCOUNTER — OFFICE VISIT (OUTPATIENT)
Dept: FAMILY MEDICINE CLINIC | Facility: CLINIC | Age: 34
End: 2024-04-30

## 2024-04-30 VITALS
HEART RATE: 93 BPM | DIASTOLIC BLOOD PRESSURE: 94 MMHG | SYSTOLIC BLOOD PRESSURE: 144 MMHG | BODY MASS INDEX: 36.68 KG/M2 | WEIGHT: 207 LBS | HEIGHT: 63 IN

## 2024-04-30 DIAGNOSIS — I10 PRIMARY HYPERTENSION: Primary | ICD-10-CM

## 2024-04-30 DIAGNOSIS — E66.09 CLASS 2 OBESITY DUE TO EXCESS CALORIES WITHOUT SERIOUS COMORBIDITY WITH BODY MASS INDEX (BMI) OF 35.0 TO 35.9 IN ADULT: ICD-10-CM

## 2024-04-30 DIAGNOSIS — E78.1 HYPERTRIGLYCERIDEMIA: ICD-10-CM

## 2024-04-30 DIAGNOSIS — R73.03 PREDIABETES: ICD-10-CM

## 2024-04-30 PROCEDURE — 3008F BODY MASS INDEX DOCD: CPT | Performed by: NURSE PRACTITIONER

## 2024-04-30 PROCEDURE — 3080F DIAST BP >= 90 MM HG: CPT | Performed by: NURSE PRACTITIONER

## 2024-04-30 PROCEDURE — 3077F SYST BP >= 140 MM HG: CPT | Performed by: NURSE PRACTITIONER

## 2024-04-30 PROCEDURE — 99213 OFFICE O/P EST LOW 20 MIN: CPT | Performed by: NURSE PRACTITIONER

## 2024-04-30 RX ORDER — OLMESARTAN MEDOXOMIL AND HYDROCHLOROTHIAZIDE 20/12.5 20; 12.5 MG/1; MG/1
1 TABLET ORAL DAILY
Qty: 90 TABLET | Refills: 1 | Status: SHIPPED | OUTPATIENT
Start: 2024-04-30

## 2024-04-30 NOTE — PROGRESS NOTES
HPI    Patient presents for concerns of headache, fatigue and dizziness recently.  Has run out of bp meds.     Feels like she is gaining a lot of weight.  Does not exercise regularly.  With a history of prediabetes and hyperlipidemia.      Review of Systems   Constitutional:  Positive for fatigue and unexpected weight change.   Neurological:  Positive for dizziness.        Vitals:    24 1050   BP: (!) 144/94   Pulse: 93   Weight: 207 lb (93.9 kg)   Height: 5' 3\" (1.6 m)     Body mass index is 36.67 kg/m².    Health Maintenance   Topic Date Due    COVID-19 Vaccine ( season) 2023    Annual Depression Screening  2024    Annual Physical  2024    Pap Smear  2026    DTaP,Tdap,and Td Vaccines (4 - Td or Tdap) 2032    Influenza Vaccine  Completed    Pneumococcal Vaccine: Birth to 64yrs  Aged Out       Patient's last menstrual period was 2024 (approximate).    Past Medical History:    Anxiety    Asthma (HCC)    High blood pressure    Visual impairment    wears eye glasses       .  Past Surgical History:   Procedure Laterality Date      x2    x3    Cholecystectomy      Laparoscopic cholecystectomy  2017       Family History   Problem Relation Age of Onset    Diabetes Mother     Hypertension Mother     Obesity Mother     Other (dm) Mother     Other (htn) Mother     Hypertension Father     Asthma Father     Diabetes Father     Other (htn) Father     Colon Cancer Maternal Grandmother     Cancer Maternal Grandmother         uterine    Hypertension Maternal Grandmother     Diabetes Paternal Grandmother     Diabetes Paternal Grandfather        Social History     Socioeconomic History    Marital status:      Spouse name: Not on file    Number of children: Not on file    Years of education: Not on file    Highest education level: Not on file   Occupational History    Not on file   Tobacco Use    Smoking status: Former     Types: Cigarettes    Smokeless  tobacco: Never    Tobacco comments:     socially when she was younger x 1 month   Vaping Use    Vaping status: Never Used   Substance and Sexual Activity    Alcohol use: Not Currently     Comment: occasional    Drug use: Never    Sexual activity: Yes     Birth control/protection: Condom   Other Topics Concern    Not on file   Social History Narrative    Not on file     Social Determinants of Health     Financial Resource Strain: Not on file   Food Insecurity: Not on file   Transportation Needs: Not on file   Physical Activity: Not on file   Stress: Not on file   Social Connections: Not on file   Housing Stability: At Risk (8/18/2023)    [facility]     Living Situation: Not on file     Housing Problems: Not on file       Current Outpatient Medications   Medication Sig Dispense Refill    Olmesartan Medoxomil-HCTZ 20-12.5 MG Oral Tab Take 1 tablet by mouth daily. 90 tablet 1       Allergies:  No Known Allergies    Physical Exam  Vitals and nursing note reviewed.   Constitutional:       General: She is not in acute distress.     Appearance: Normal appearance.   Cardiovascular:      Rate and Rhythm: Normal rate and regular rhythm.      Heart sounds: Normal heart sounds.   Pulmonary:      Effort: Pulmonary effort is normal. No respiratory distress.      Breath sounds: Normal breath sounds. No stridor. No wheezing, rhonchi or rales.   Chest:      Chest wall: No tenderness.   Neurological:      Mental Status: She is alert and oriented to person, place, and time.          Assessment and Plan:  Problem List Items Addressed This Visit    None  Visit Diagnoses       Primary hypertension    -  Primary    Relevant Medications    Olmesartan Medoxomil-HCTZ 20-12.5 MG Oral Tab    Other Relevant Orders    Comp Metabolic Panel (14)    Hypertriglyceridemia        Relevant Orders    Lipid Panel    Prediabetes        Relevant Medications    Olmesartan Medoxomil-HCTZ 20-12.5 MG Oral Tab    Other Relevant  Orders    Hemoglobin A1C    Class 2 obesity due to excess calories without serious comorbidity with body mass index (BMI) of 35.0 to 35.9 in adult                            Discussed plan of care with patient and patient is in agreement.  All questions answered. Patient to call with questions or concerns.    Encouraged to sign up for My Chart if not already registered.

## 2024-05-01 ENCOUNTER — LAB ENCOUNTER (OUTPATIENT)
Dept: LAB | Age: 34
End: 2024-05-01
Attending: NURSE PRACTITIONER
Payer: COMMERCIAL

## 2024-05-01 DIAGNOSIS — E78.1 HYPERTRIGLYCERIDEMIA: ICD-10-CM

## 2024-05-01 DIAGNOSIS — I10 PRIMARY HYPERTENSION: ICD-10-CM

## 2024-05-01 DIAGNOSIS — R73.03 PREDIABETES: ICD-10-CM

## 2024-05-01 LAB
ALBUMIN SERPL-MCNC: 4.3 G/DL (ref 3.2–4.8)
ALBUMIN/GLOB SERPL: 1.4 {RATIO} (ref 1–2)
ALP LIVER SERPL-CCNC: 68 U/L
ALT SERPL-CCNC: 26 U/L
ANION GAP SERPL CALC-SCNC: 0 MMOL/L (ref 0–18)
AST SERPL-CCNC: 20 U/L (ref ?–34)
BILIRUB SERPL-MCNC: 0.4 MG/DL (ref 0.3–1.2)
BUN BLD-MCNC: 10 MG/DL (ref 9–23)
BUN/CREAT SERPL: 13.7 (ref 10–20)
CALCIUM BLD-MCNC: 8.9 MG/DL (ref 8.7–10.4)
CHLORIDE SERPL-SCNC: 111 MMOL/L (ref 98–112)
CHOLEST SERPL-MCNC: 148 MG/DL (ref ?–200)
CO2 SERPL-SCNC: 26 MMOL/L (ref 21–32)
CREAT BLD-MCNC: 0.73 MG/DL
EGFRCR SERPLBLD CKD-EPI 2021: 111 ML/MIN/1.73M2 (ref 60–?)
EST. AVERAGE GLUCOSE BLD GHB EST-MCNC: 103 MG/DL (ref 68–126)
FASTING PATIENT LIPID ANSWER: YES
FASTING STATUS PATIENT QL REPORTED: YES
GLOBULIN PLAS-MCNC: 3 G/DL (ref 2.8–4.4)
GLUCOSE BLD-MCNC: 93 MG/DL (ref 70–99)
HBA1C MFR BLD: 5.2 % (ref ?–5.7)
HDLC SERPL-MCNC: 51 MG/DL (ref 40–59)
LDLC SERPL CALC-MCNC: 79 MG/DL (ref ?–100)
NONHDLC SERPL-MCNC: 97 MG/DL (ref ?–130)
OSMOLALITY SERPL CALC.SUM OF ELEC: 283 MOSM/KG (ref 275–295)
POTASSIUM SERPL-SCNC: 3.6 MMOL/L (ref 3.5–5.1)
PROT SERPL-MCNC: 7.3 G/DL (ref 5.7–8.2)
SODIUM SERPL-SCNC: 137 MMOL/L (ref 136–145)
TRIGL SERPL-MCNC: 100 MG/DL (ref 30–149)
VLDLC SERPL CALC-MCNC: 16 MG/DL (ref 0–30)

## 2024-05-01 PROCEDURE — 36415 COLL VENOUS BLD VENIPUNCTURE: CPT

## 2024-05-01 PROCEDURE — 80061 LIPID PANEL: CPT

## 2024-05-01 PROCEDURE — 83036 HEMOGLOBIN GLYCOSYLATED A1C: CPT

## 2024-05-01 PROCEDURE — 80053 COMPREHEN METABOLIC PANEL: CPT

## 2024-05-08 ENCOUNTER — OFFICE VISIT (OUTPATIENT)
Dept: FAMILY MEDICINE CLINIC | Facility: CLINIC | Age: 34
End: 2024-05-08
Payer: COMMERCIAL

## 2024-05-08 VITALS
SYSTOLIC BLOOD PRESSURE: 117 MMHG | HEIGHT: 63 IN | BODY MASS INDEX: 36.32 KG/M2 | HEART RATE: 92 BPM | DIASTOLIC BLOOD PRESSURE: 82 MMHG | WEIGHT: 205 LBS

## 2024-05-08 DIAGNOSIS — G43.009 MIGRAINE WITHOUT AURA AND WITHOUT STATUS MIGRAINOSUS, NOT INTRACTABLE: Primary | ICD-10-CM

## 2024-05-08 DIAGNOSIS — G47.30 SLEEP APNEA, UNSPECIFIED TYPE: ICD-10-CM

## 2024-05-08 PROCEDURE — 3074F SYST BP LT 130 MM HG: CPT | Performed by: FAMILY MEDICINE

## 2024-05-08 PROCEDURE — 3079F DIAST BP 80-89 MM HG: CPT | Performed by: FAMILY MEDICINE

## 2024-05-08 PROCEDURE — 99214 OFFICE O/P EST MOD 30 MIN: CPT | Performed by: FAMILY MEDICINE

## 2024-05-08 PROCEDURE — 3008F BODY MASS INDEX DOCD: CPT | Performed by: FAMILY MEDICINE

## 2024-05-08 RX ORDER — TOPIRAMATE 25 MG/1
25 TABLET ORAL 2 TIMES DAILY
Qty: 60 TABLET | Refills: 2 | Status: SHIPPED | OUTPATIENT
Start: 2024-05-08

## 2024-05-08 RX ORDER — SUMATRIPTAN 50 MG/1
50 TABLET, FILM COATED ORAL
Qty: 9 TABLET | Refills: 1 | Status: SHIPPED | OUTPATIENT
Start: 2024-05-08

## 2024-05-08 NOTE — PROGRESS NOTES
2024  10:01 AM    Leonela Sy is a 33 year old female.    Chief complaint(s):   Chief Complaint   Patient presents with    Test Results    Dizziness    Weight Problem     HPI:     Leonela Sy primary complaint is regarding Headache, snoring .     Concerning headache,  Leonela Sy was diagnosed with migraine headaches more than 1yrs ago.  She has had prior headaches similar to this one.  Typical precipitating factors include lack of sleep,  and skipping meals.  The current headache began 1 month ago.    Description of Headache:  The location is primarily on bi temporal.  It does not radiate.  She characterizes the headache as moderate in severity; characterized as  throbbing, pounding, a sensation \"like my head will explode\", and \"sinus pressure\".  Associated symptoms include + hemiplegia, nausea, phonophobia, photophobia, rhinorrhea, vision disturbance ( scotoma, visual distortion with wavy lines, photopsia -visual flashes of light ) and vomiting.  Compliance with treatment has been fair .  Pertinent past medical history includes hypertension. She denies a history of +/- sleep apnea, never been tested.    + snoring, frequent awakening and day time drowsy.   Current Use of Meds to Treat Headaches:  Abortive meds used for this headache: acetaminophen  Prophylactic meds used in general: none    Previous brain imaging study include none .         HISTORY:  Past Medical History:    Anxiety    Asthma (HCC)    High blood pressure    Visual impairment    wears eye glasses      Past Surgical History:   Procedure Laterality Date      x2    x3    Cholecystectomy      Laparoscopic cholecystectomy  2017      Family History   Problem Relation Age of Onset    Diabetes Mother     Hypertension Mother     Obesity Mother     Other (dm) Mother     Other (htn) Mother     Hypertension Father     Asthma Father     Diabetes Father     Other (htn) Father      Colon Cancer Maternal Grandmother     Cancer Maternal Grandmother         uterine    Hypertension Maternal Grandmother     Diabetes Paternal Grandmother     Diabetes Paternal Grandfather       Social History:   Social History     Socioeconomic History    Marital status:    Tobacco Use    Smoking status: Former     Types: Cigarettes    Smokeless tobacco: Never    Tobacco comments:     socially when she was younger x 1 month   Vaping Use    Vaping status: Never Used   Substance and Sexual Activity    Alcohol use: Not Currently     Comment: occasional    Drug use: Never    Sexual activity: Yes     Birth control/protection: Condom     Social Determinants of Health      Received [facility]        Immunizations:   Immunization History   Administered Date(s) Administered    Covid-19 Vaccine Pfizer 30 mcg/0.3 ml 12/16/2021    FLULAVAL 6 months & older 0.5 ml Prefilled syringe (37496) 01/21/2021, 03/03/2022    FLUZONE 6 months and older PFS 0.5 ml (83692) 09/25/2023    TDAP 06/03/2019, 04/09/2021, 07/26/2022       Medications (Active prior to today's visit):  Current Outpatient Medications   Medication Sig Dispense Refill    topiramate 25 MG Oral Tab Take 1 tablet (25 mg total) by mouth 2 (two) times daily. 60 tablet 2    SUMAtriptan (IMITREX) 50 MG Oral Tab Take 1 tablet (50 mg total) by mouth Q24H PRN for Migraine. Use at onset; repeat once after 2 HRS-ONLY 2 IN 24 HR MAX 9 tablet 1    Olmesartan Medoxomil-HCTZ 20-12.5 MG Oral Tab Take 1 tablet by mouth daily. 90 tablet 1       Allergies:  No Known Allergies      ROS:   Review of Systems   Constitutional:  Negative for appetite change and fever.   Eyes:  Negative for visual disturbance.   Respiratory:  Positive for apnea. Negative for shortness of breath.    Cardiovascular:  Negative for chest pain.   Gastrointestinal:  Positive for nausea and vomiting. Negative for abdominal pain.   Musculoskeletal:  Negative for back pain.   Skin:   Negative for rash.   Neurological:  Positive for dizziness and headaches.   Psychiatric/Behavioral:  Positive for sleep disturbance.        PHYSICAL EXAM:   VS: /82   Pulse 92   Ht 5' 3\" (1.6 m)   Wt 205 lb (93 kg)   LMP 02/02/2024 (Approximate)   BMI 36.31 kg/m²     Physical Exam  Vitals reviewed.   Constitutional:       General: She is not in acute distress.     Appearance: Normal appearance.   HENT:      Head: Normocephalic.   Eyes:      Conjunctiva/sclera: Conjunctivae normal.   Cardiovascular:      Rate and Rhythm: Normal rate.   Pulmonary:      Effort: Pulmonary effort is normal.   Musculoskeletal:      Cervical back: Neck supple.   Skin:     Findings: No rash.   Psychiatric:         Mood and Affect: Mood normal.         LABORATORY RESULTS:   No results found for: \"URCOLOR\", \"URCLA\", \"URINELEUK\", \"URINENITRITE\", \"URINEBLOOD\"   Results for orders placed or performed in visit on 05/01/24   Lipid Panel   Result Value Ref Range    Cholesterol, Total 148 <200 mg/dL    HDL Cholesterol 51 40 - 59 mg/dL    Triglycerides 100 30 - 149 mg/dL    LDL Cholesterol 79 <100 mg/dL    VLDL 16 0 - 30 mg/dL    Non HDL Chol 97 <130 mg/dL    Patient Fasting for Lipid? Yes    Hemoglobin A1C   Result Value Ref Range    HgbA1C 5.2 <5.7 %    Estimated Average Glucose 103 68 - 126 mg/dL   Comp Metabolic Panel (14)   Result Value Ref Range    Glucose 93 70 - 99 mg/dL    Sodium 137 136 - 145 mmol/L    Potassium 3.6 3.5 - 5.1 mmol/L    Chloride 111 98 - 112 mmol/L    CO2 26.0 21.0 - 32.0 mmol/L    Anion Gap 0 0 - 18 mmol/L    BUN 10 9 - 23 mg/dL    Creatinine 0.73 0.55 - 1.02 mg/dL    BUN/CREA Ratio 13.7 10.0 - 20.0    Calcium, Total 8.9 8.7 - 10.4 mg/dL    Calculated Osmolality 283 275 - 295 mOsm/kg    eGFR-Cr 111 >=60 mL/min/1.73m2    ALT 26 10 - 49 U/L    AST 20 <=34 U/L    Alkaline Phosphatase 68 37 - 98 U/L    Bilirubin, Total 0.4 0.3 - 1.2 mg/dL    Total Protein 7.3 5.7 - 8.2 g/dL    Albumin 4.3 3.2 - 4.8 g/dL    Globulin   3.0 2.8 - 4.4 g/dL    A/G Ratio 1.4 1.0 - 2.0    Patient Fasting for CMP? Yes        EKG / Spirometry : -     Radiology: No results found.     ASSESSMENT/PLAN:   Assessment   Encounter Diagnoses   Name Primary?    Migraine without aura and without status migrainosus, not intractable Yes    Sleep apnea, unspecified type        1. Migraine without aura and without status migrainosus, not intractable      Headache  MEDICATIONS:   topiramate 25 MG Oral Tab, Take 1 tablet (25 mg total) by mouth 2 (two) times daily., Disp: 60 tablet, Rfl: 2    SUMAtriptan (IMITREX) 50 MG Oral Tab, Take 1 tablet (50 mg total) by mouth Q24H PRN for Migraine. Use at onset; repeat once after 2 HRS-ONLY 2 IN 24 HR MAX, Disp: 9 tablet, Rfl: 1    Olmesartan Medoxomil-HCTZ 20-12.5 MG Oral Tab, Take 1 tablet by mouth daily., Disp: 90 tablet, Rfl: 1    Refills Given today:    Requested Prescriptions     Signed Prescriptions Disp Refills    topiramate 25 MG Oral Tab 60 tablet 2     Sig: Take 1 tablet (25 mg total) by mouth 2 (two) times daily.    SUMAtriptan (IMITREX) 50 MG Oral Tab 9 tablet 1     Sig: Take 1 tablet (50 mg total) by mouth Q24H PRN for Migraine. Use at onset; repeat once after 2 HRS-ONLY 2 IN 24 HR MAX   .    REFERRALS: 1[facility] ALT REFERRAL HOME SLEEP APNEA TEST,       Procedures    Home Sleep Apnea Test (Adult pt only) - Sleep consult required for Medicare pts   .  RECOMMENDATIONS given include: increase oral fluid intake, modify diet to avoid known headache triggers, and maintain normal sleep patterns. Advised to keep a headache diary.  Counseled regarding lifestyle modifications.  Further diagnostic evaluation per orders.  Medication changes per orders.,  sleep hygiene, avoid caffeine .  FOLLOW-UP:  Instructed to call if she develops new or worsening symptoms, including worsening intensity and confusion.   Schedule a follow-up appointment in 6 weeks.        2. Sleep apnea, unspecified type    Weight loss plan  Referral:  - Home  Sleep Apnea Test (Adult pt only) - Sleep consult required for Medicare pts  - General sleep study; Future   Follow up [facility]       Orders This Visit:  No orders of the defined types were placed in this encounter.      Meds This Visit:  Requested Prescriptions     Signed Prescriptions Disp Refills    topiramate 25 MG Oral Tab 60 tablet 2     Sig: Take 1 tablet (25 mg total) by mouth 2 (two) times daily.    SUMAtriptan (IMITREX) 50 MG Oral Tab 9 tablet 1     Sig: Take 1 tablet (50 mg total) by mouth Q24H PRN for Migraine. Use at onset; repeat once after 2 HRS-ONLY 2 IN 24 HR MAX       Imaging & Referrals:  [facility] ALT REFERRAL HOME SLEEP APNEA TEST         TANVIR CROCKETT MD

## 2024-06-10 ENCOUNTER — OFFICE VISIT (OUTPATIENT)
Dept: SLEEP CENTER | Age: 34
End: 2024-06-10
Attending: FAMILY MEDICINE
Payer: COMMERCIAL

## 2024-06-10 DIAGNOSIS — G47.30 SLEEP APNEA, UNSPECIFIED TYPE: ICD-10-CM

## 2024-06-10 PROCEDURE — 95806 SLEEP STUDY UNATT&RESP EFFT: CPT

## 2024-06-13 DIAGNOSIS — G47.33 OBSTRUCTIVE SLEEP APNEA SYNDROME: Primary | ICD-10-CM

## 2024-06-19 ENCOUNTER — OFFICE VISIT (OUTPATIENT)
Dept: FAMILY MEDICINE CLINIC | Facility: CLINIC | Age: 34
End: 2024-06-19

## 2024-06-19 VITALS
WEIGHT: 204.38 LBS | HEART RATE: 87 BPM | RESPIRATION RATE: 14 BRPM | TEMPERATURE: 97 F | DIASTOLIC BLOOD PRESSURE: 88 MMHG | SYSTOLIC BLOOD PRESSURE: 130 MMHG | BODY MASS INDEX: 36 KG/M2

## 2024-06-19 DIAGNOSIS — G47.00 INSOMNIA, UNSPECIFIED TYPE: ICD-10-CM

## 2024-06-19 DIAGNOSIS — G47.30 SLEEP APNEA, UNSPECIFIED TYPE: ICD-10-CM

## 2024-06-19 DIAGNOSIS — G43.009 MIGRAINE WITHOUT AURA AND WITHOUT STATUS MIGRAINOSUS, NOT INTRACTABLE: Primary | ICD-10-CM

## 2024-06-19 PROCEDURE — 3075F SYST BP GE 130 - 139MM HG: CPT | Performed by: FAMILY MEDICINE

## 2024-06-19 PROCEDURE — 99214 OFFICE O/P EST MOD 30 MIN: CPT | Performed by: FAMILY MEDICINE

## 2024-06-19 PROCEDURE — 3079F DIAST BP 80-89 MM HG: CPT | Performed by: FAMILY MEDICINE

## 2024-06-19 RX ORDER — SUMATRIPTAN 50 MG/1
50 TABLET, FILM COATED ORAL
Qty: 9 TABLET | Refills: 1 | Status: SHIPPED | OUTPATIENT
Start: 2024-06-19

## 2024-06-19 RX ORDER — TOPIRAMATE 50 MG/1
50 TABLET, FILM COATED ORAL 2 TIMES DAILY
Qty: 60 TABLET | Refills: 3 | Status: SHIPPED | OUTPATIENT
Start: 2024-06-19

## 2024-06-19 RX ORDER — TEMAZEPAM 15 MG/1
15 CAPSULE ORAL NIGHTLY PRN
Qty: 90 CAPSULE | Refills: 1 | Status: SHIPPED | OUTPATIENT
Start: 2024-06-19

## 2024-06-19 NOTE — PROGRESS NOTES
6/19/2024  8:34 AM    Leonela Sy is a 33 year old female.    Chief complaint(s):   Chief Complaint   Patient presents with    Follow - Up     Migraines      HPI:     Leonela Sy primary complaint is regarding multiple complaints.     Concerning headache,  Leonela Sy was diagnosed with migraine headaches more than 1yrs ago.  She has had prior headaches similar to this one.  Typical precipitating factors include lack of sleep,  and skipping meals.  The current headache began none.    Description of Headache:  The location is primarily on bi temporal.  It does not radiate.  She characterizes the headache as moderate in severity; characterized as  throbbing, pounding, a sensation \"like my head will explode\", and \"sinus pressure\".  Associated symptoms include + hemiplegia, nausea, phonophobia, photophobia, rhinorrhea, vision disturbance ( scotoma, visual distortion with wavy lines, photopsia -visual flashes of light ) and vomiting.  Compliance with treatment has been fair .  Pertinent past medical history includes hypertension. She denies a history of +/- sleep apnea, never been tested.    + snoring, frequent awakening and day time drowsy.   Current Use of Meds to Treat Headaches:  Abortive meds used for this headache: Imitrex, acetaminophen  Prophylactic meds used in general: Topiramate 25 mg BID   Having 4 MHAs a week.   Previous brain imaging study include none .      Insomnia details; this has been noted for the past 2 weeks.  Sleep has been disrupted by a delay in initiation of sleep, frequent awakenings, and early morning awakening.  On average, she estimates that she gets  5 hours of sleep per night.  Associated symptoms include agitation and anxiety.  Patinet denies symptoms of + snoring apnea, no depression, + excessive daytime fatigue.  There are current identifiable stressors.  Medical history is negative for alcohol use, anxiety/stress,  depression, restless legs syndrome and sleep apnea.       HISTORY:  Past Medical History:    Anxiety    Asthma (HCC)    High blood pressure    Visual impairment    wears eye glasses      Past Surgical History:   Procedure Laterality Date      x2    x3    Cholecystectomy      Laparoscopic cholecystectomy  2017      Family History   Problem Relation Age of Onset    Diabetes Mother     Hypertension Mother     Obesity Mother     Other (dm) Mother     Other (htn) Mother     Hypertension Father     Asthma Father     Diabetes Father     Other (htn) Father     Colon Cancer Maternal Grandmother     Cancer Maternal Grandmother         uterine    Hypertension Maternal Grandmother     Diabetes Paternal Grandmother     Diabetes Paternal Grandfather       Social History:   Social History     Socioeconomic History    Marital status:    Tobacco Use    Smoking status: Former     Types: Cigarettes    Smokeless tobacco: Never    Tobacco comments:     socially when she was younger x 1 month   Vaping Use    Vaping status: Never Used   Substance and Sexual Activity    Alcohol use: Not Currently     Comment: occasional    Drug use: Never    Sexual activity: Yes     Birth control/protection: Condom     Social Determinants of Health      Received from [facility]        Immunizations:   Immunization History   Administered Date(s) Administered    Covid-19 Vaccine Pfizer 30 mcg/0.3 ml 2021    FLULAVAL 6 months & older 0.5 ml Prefilled syringe (78811) 2021, 2022    FLUZONE 6 months and older PFS 0.5 ml (00473) 2023    TDAP 2019, 2021, 2022       Medications (Active prior to today's visit):  Current Outpatient Medications   Medication Sig Dispense Refill    topiramate 50 MG Oral Tab Take 1 tablet (50 mg total) by mouth 2 (two) times daily. 60 tablet 3    temazepam 15 MG Oral Cap Take 1 capsule (15 mg total) by mouth nightly as needed. 90 capsule 1    SUMAtriptan  (IMITREX) 50 MG Oral Tab Take 1 tablet (50 mg total) by mouth Q24H PRN for Migraine. Use at onset; repeat once after 2 HRS-ONLY 2 IN 24 HR MAX 9 tablet 1    Olmesartan Medoxomil-HCTZ 20-12.5 MG Oral Tab Take 1 tablet by mouth daily. 90 tablet 1       Allergies:  No Known Allergies      ROS:   Review of Systems   Constitutional:  Negative for appetite change and fever.   Eyes:  Negative for visual disturbance.   Respiratory:  Negative for shortness of breath.    Cardiovascular:  Negative for chest pain.   Gastrointestinal:  Negative for abdominal pain, nausea and vomiting.   Musculoskeletal:  Negative for back pain.   Skin:  Negative for rash.   Neurological:  Positive for headaches. Negative for dizziness.   Psychiatric/Behavioral:  Positive for sleep disturbance.        PHYSICAL EXAM:   VS: /88   Pulse 87   Temp 97.1 °F (36.2 °C) (Temporal)   Resp 14   Wt 204 lb 6.4 oz (92.7 kg)   LMP 04/02/2024 (Approximate)   Breastfeeding No   BMI 36.21 kg/m²     Physical Exam  Vitals reviewed.   Constitutional:       General: She is not in acute distress.     Appearance: Normal appearance.   HENT:      Head: Normocephalic.   Eyes:      Conjunctiva/sclera: Conjunctivae normal.   Cardiovascular:      Rate and Rhythm: Normal rate.   Pulmonary:      Effort: Pulmonary effort is normal.   Musculoskeletal:      Cervical back: Neck supple.   Skin:     Findings: No rash.   Psychiatric:         Mood and Affect: Mood normal.         LABORATORY RESULTS:   No results found for: \"URCOLOR\", \"URCLA\", \"URINELEUK\", \"URINENITRITE\", \"URINEBLOOD\"   Results for orders placed or performed in visit on 05/01/24   Lipid Panel   Result Value Ref Range    Cholesterol, Total 148 <200 mg/dL    HDL Cholesterol 51 40 - 59 mg/dL    Triglycerides 100 30 - 149 mg/dL    LDL Cholesterol 79 <100 mg/dL    VLDL 16 0 - 30 mg/dL    Non HDL Chol 97 <130 mg/dL    Patient Fasting for Lipid? Yes    Hemoglobin A1C   Result Value Ref Range    HgbA1C 5.2 <5.7 %     Estimated Average Glucose 103 68 - 126 mg/dL   Comp Metabolic Panel (14)   Result Value Ref Range    Glucose 93 70 - 99 mg/dL    Sodium 137 136 - 145 mmol/L    Potassium 3.6 3.5 - 5.1 mmol/L    Chloride 111 98 - 112 mmol/L    CO2 26.0 21.0 - 32.0 mmol/L    Anion Gap 0 0 - 18 mmol/L    BUN 10 9 - 23 mg/dL    Creatinine 0.73 0.55 - 1.02 mg/dL    BUN/CREA Ratio 13.7 10.0 - 20.0    Calcium, Total 8.9 8.7 - 10.4 mg/dL    Calculated Osmolality 283 275 - 295 mOsm/kg    eGFR-Cr 111 >=60 mL/min/1.73m2    ALT 26 10 - 49 U/L    AST 20 <=34 U/L    Alkaline Phosphatase 68 37 - 98 U/L    Bilirubin, Total 0.4 0.3 - 1.2 mg/dL    Total Protein 7.3 5.7 - 8.2 g/dL    Albumin 4.3 3.2 - 4.8 g/dL    Globulin  3.0 2.8 - 4.4 g/dL    A/G Ratio 1.4 1.0 - 2.0    Patient Fasting for CMP? Yes        EKG / Spirometry : -     Radiology: No results found.     ASSESSMENT/PLAN:   Assessment   Encounter Diagnoses   Name Primary?    Migraine without aura and without status migrainosus, not intractable Yes    Sleep apnea, unspecified type     Insomnia, unspecified type        1. Migraine without aura and without status migrainosus, not intractable    MEDICATIONS:   topiramate 50 MG Oral Tab, Take 1 tablet (50 mg total) by mouth 2 (two) times daily., Disp: 60 tablet, Rfl: 3    temazepam 15 MG Oral Cap, Take 1 capsule (15 mg total) by mouth nightly as needed., Disp: 90 capsule, Rfl: 1    SUMAtriptan (IMITREX) 50 MG Oral Tab, Take 1 tablet (50 mg total) by mouth Q24H PRN for Migraine. Use at onset; repeat once after 2 HRS-ONLY 2 IN 24 HR MAX, Disp: 9 tablet, Rfl: 1    Olmesartan Medoxomil-HCTZ 20-12.5 MG Oral Tab, Take 1 tablet by mouth daily., Disp: 90 tablet, Rfl: 1    Refills Given today:    Requested Prescriptions     Signed Prescriptions Disp Refills    topiramate 50 MG Oral Tab 60 tablet 3     Sig: Take 1 tablet (50 mg total) by mouth 2 (two) times daily.    temazepam 15 MG Oral Cap 90 capsule 1     Sig: Take 1 capsule (15 mg total) by mouth  nightly as needed.    SUMAtriptan (IMITREX) 50 MG Oral Tab 9 tablet 1     Sig: Take 1 tablet (50 mg total) by mouth Q24H PRN for Migraine. Use at onset; repeat once after 2 HRS-ONLY 2 IN 24 HR MAX   .    REFERRALS: 1None, No orders of the defined types were placed in this encounter.  .  RECOMMENDATIONS given include: increase oral fluid intake, modify diet to avoid known headache triggers, and maintain normal sleep patterns. Advised to keep a headache diary.  Counseled regarding lifestyle modifications.  Further diagnostic evaluation per orders.  Medication changes per orders.,  sleep hygiene, avoid caffeine .  FOLLOW-UP:   Instructed to call if she develops new or worsening symptoms, including worsening intensity and confusion.   Schedule a follow-up appointment in 6 weeks.                      2. Sleep apnea, unspecified type  3. Insomnia, unspecified type    MEDICATIONS:      temazepam 15 MG Oral Cap 90 capsule 1     Sig: Take 1 capsule (15 mg total) by mouth nightly as needed.     REFERRALS: Sleep study     RECOMMENDATIONS given include: Patient was reassured of  her medical condition and all questions and concerns were answered. Patient was informed to please, call our office with any new or further questions or concerns that may come up in the near future. Notify Dr Castillo or the [facility] if there is a deterioration or worsening of the medical condition. Also, inform the doctor with any new symptoms or medications' side effects.      FOLLOW-UP: Schedule a follow-up visit in  [facility].            Orders This Visit:  No orders of the defined types were placed in this encounter.      Meds This Visit:  Requested Prescriptions     Signed Prescriptions Disp Refills    topiramate 50 MG Oral Tab 60 tablet 3     Sig: Take 1 tablet (50 mg total) by mouth 2 (two) times daily.    temazepam 15 MG Oral Cap 90 capsule 1     Sig: Take 1 capsule (15 mg total) by mouth nightly as needed.    SUMAtriptan (IMITREX) 50 MG Oral  Tab 9 tablet 1     Sig: Take 1 tablet (50 mg total) by mouth Q24H PRN for Migraine. Use at onset; repeat once after 2 HRS-ONLY 2 IN 24 HR MAX       Imaging & Referrals:  None         TANVIR CROCKETT MD

## 2024-06-21 ENCOUNTER — OFFICE VISIT (OUTPATIENT)
Dept: SLEEP CENTER | Age: 34
End: 2024-06-21
Attending: FAMILY MEDICINE

## 2024-06-21 DIAGNOSIS — G47.33 OBSTRUCTIVE SLEEP APNEA SYNDROME: ICD-10-CM

## 2024-06-21 PROCEDURE — 95811 POLYSOM 6/>YRS CPAP 4/> PARM: CPT

## 2024-06-24 DIAGNOSIS — G47.31 PRIMARY CENTRAL SLEEP APNEA: Primary | ICD-10-CM

## 2024-06-25 ENCOUNTER — TELEPHONE (OUTPATIENT)
Dept: FAMILY MEDICINE CLINIC | Facility: CLINIC | Age: 34
End: 2024-06-25

## 2024-06-25 DIAGNOSIS — G47.33 OSA (OBSTRUCTIVE SLEEP APNEA): Primary | ICD-10-CM

## 2024-06-28 ENCOUNTER — TELEPHONE (OUTPATIENT)
Dept: FAMILY MEDICINE CLINIC | Facility: CLINIC | Age: 34
End: 2024-06-28

## 2024-08-13 ENCOUNTER — OFFICE VISIT (OUTPATIENT)
Dept: FAMILY MEDICINE CLINIC | Facility: CLINIC | Age: 34
End: 2024-08-13

## 2024-08-13 VITALS
OXYGEN SATURATION: 98 % | WEIGHT: 208.19 LBS | SYSTOLIC BLOOD PRESSURE: 114 MMHG | BODY MASS INDEX: 36.89 KG/M2 | HEIGHT: 63 IN | HEART RATE: 87 BPM | DIASTOLIC BLOOD PRESSURE: 79 MMHG

## 2024-08-13 DIAGNOSIS — R11.2 NAUSEA AND VOMITING, UNSPECIFIED VOMITING TYPE: ICD-10-CM

## 2024-08-13 DIAGNOSIS — R30.0 DYSURIA: ICD-10-CM

## 2024-08-13 DIAGNOSIS — Z90.49 S/P LAPAROSCOPIC APPENDECTOMY: ICD-10-CM

## 2024-08-13 DIAGNOSIS — K59.00 CONSTIPATION, UNSPECIFIED CONSTIPATION TYPE: Primary | ICD-10-CM

## 2024-08-13 LAB
APPEARANCE: CLEAR
BILIRUBIN: NEGATIVE
GLUCOSE (URINE DIPSTICK): NEGATIVE MG/DL
KETONES (URINE DIPSTICK): NEGATIVE MG/DL
MULTISTIX LOT#: ABNORMAL NUMERIC
NITRITE, URINE: POSITIVE
OCCULT BLOOD: NEGATIVE
PH, URINE: 6 (ref 4.5–8)
PROTEIN (URINE DIPSTICK): 100 MG/DL
SPECIFIC GRAVITY: 1.03 (ref 1–1.03)
URINE-COLOR: YELLOW
UROBILINOGEN,SEMI-QN: 0.2 MG/DL (ref 0–1.9)

## 2024-08-13 PROCEDURE — 3008F BODY MASS INDEX DOCD: CPT | Performed by: NURSE PRACTITIONER

## 2024-08-13 PROCEDURE — 99214 OFFICE O/P EST MOD 30 MIN: CPT | Performed by: NURSE PRACTITIONER

## 2024-08-13 PROCEDURE — 3074F SYST BP LT 130 MM HG: CPT | Performed by: NURSE PRACTITIONER

## 2024-08-13 PROCEDURE — 81003 URINALYSIS AUTO W/O SCOPE: CPT | Performed by: NURSE PRACTITIONER

## 2024-08-13 PROCEDURE — 3078F DIAST BP <80 MM HG: CPT | Performed by: NURSE PRACTITIONER

## 2024-08-13 RX ORDER — NITROFURANTOIN 25; 75 MG/1; MG/1
100 CAPSULE ORAL 2 TIMES DAILY
Qty: 14 CAPSULE | Refills: 0 | Status: SHIPPED | OUTPATIENT
Start: 2024-08-13

## 2024-08-13 NOTE — PROGRESS NOTES
HPI  Pt presents for follow up appendectomy done 24 at [facility] by Dr Guerrero. Had follow up visit 2024.   Pathology was neg for appendicitis-symptoms may be related to ovarian cyst/rupture.     Is feeling overall better, having some tenderness to incision at base of umbilicus.     Is vomiting w eating and drinking. Feels bloated and constipated since surgery.  Feels nauseated most of the time.   Is having intermittent bouts of dysuria and decreased urination. Has been drinking cranberry juice    Last moved bowels 2 days ago.       Jaxon-Jolie interpretor [id number]  Review of Systems   Gastrointestinal:  Positive for abdominal pain, constipation, nausea and vomiting.   Genitourinary:  Positive for dysuria.   Skin:  Positive for wound.       Vitals:    24 1756   BP: 114/79   Pulse: 87   SpO2: 98%   Weight: 208 lb 3.2 oz (94.4 kg)   Height: 5' 3\" (1.6 m)     Body mass index is 36.88 kg/m².  Wt Readings from Last 6 Encounters:   24 208 lb 3.2 oz (94.4 kg)   24 204 lb 6.4 oz (92.7 kg)   24 205 lb (93 kg)   24 207 lb (93.9 kg)   23 200 lb (90.7 kg)   23 200 lb (90.7 kg)        Health Maintenance   Topic Date Due    COVID-19 Vaccine (2 -  season) 2023    Annual Physical  2024    Influenza Vaccine (1) 10/01/2024    Pap Smear  2026    DTaP,Tdap,and Td Vaccines (4 - Td or Tdap) 2032    Annual Depression Screening  Completed    Pneumococcal Vaccine: Birth to 64yrs  Aged Out       Patient's last menstrual period was 07/15/2024 (approximate).    Past Medical History:    Anxiety    Asthma (HCC)    High blood pressure    Nausea and vomiting    Visual impairment    wears eye glasses       .  Past Surgical History:   Procedure Laterality Date    Appendectomy        x2    x3    Cholecystectomy      Laparoscopic cholecystectomy  2017       Family History   Problem Relation Age of Onset    Diabetes Mother     Hypertension Mother     Obesity Mother      Other (dm) Mother     Other (htn) Mother     Hypertension Father     Asthma Father     Diabetes Father     Other (htn) Father     Colon Cancer Maternal Grandmother     Cancer Maternal Grandmother         uterine    Hypertension Maternal Grandmother     Diabetes Paternal Grandmother     Diabetes Paternal Grandfather        Social History     Socioeconomic History    Marital status:      Spouse name: Not on file    Number of children: Not on file    Years of education: Not on file    Highest education level: Not on file   Occupational History    Not on file   Tobacco Use    Smoking status: Former     Types: Cigarettes     Passive exposure: Never    Smokeless tobacco: Never    Tobacco comments:     socially when she was younger x 1 month   Vaping Use    Vaping status: Never Used   Substance and Sexual Activity    Alcohol use: Not Currently     Comment: occasional    Drug use: Never    Sexual activity: Yes     Birth control/protection: Condom   Other Topics Concern    Not on file   Social History Narrative    Not on file     Social Determinants of Health     Financial Resource Strain: Not on file   Food Insecurity: Low Risk  (7/24/2024)    Received from [facility]    Food Insecurity     Have there been times that your food ran out, and you didn't have money to get more?: No     Are there times that you worry that this might happen?: No   Transportation Needs: Low Risk  (7/24/2024)    Received from [facility]    Transportation Needs     Do you have trouble getting transportation to medical appointments?: No     How do you normally get to and from your appointments?: Not on file   Physical Activity: Not on file   Stress: Not on file   Social Connections: Not on file   Housing Stability: Low Risk  (7/24/2024)    Received from [facility]    Housing Stability     Are you worried that your electric, gas, oil, or water might be shut off?: No     Are  you concerned about having a safe and reliable place to live?: No       Current Outpatient Medications   Medication Sig Dispense Refill    nitrofurantoin monohydrate macro 100 MG Oral Cap Take 1 capsule (100 mg total) by mouth 2 (two) times daily. 14 capsule 0    topiramate 50 MG Oral Tab Take 1 tablet (50 mg total) by mouth 2 (two) times daily. 60 tablet 3    SUMAtriptan (IMITREX) 50 MG Oral Tab Take 1 tablet (50 mg total) by mouth Q24H PRN for Migraine. Use at onset; repeat once after 2 HRS-ONLY 2 IN 24 HR MAX 9 tablet 1    Olmesartan Medoxomil-HCTZ 20-12.5 MG Oral Tab Take 1 tablet by mouth daily. 90 tablet 1    temazepam 15 MG Oral Cap Take 1 capsule (15 mg total) by mouth nightly as needed. (Patient not taking: Reported on 8/13/2024) 90 capsule 1       Allergies:  No Known Allergies    Physical Exam  Vitals and nursing note reviewed.   Constitutional:       Appearance: Normal appearance. She is obese.   Cardiovascular:      Rate and Rhythm: Normal rate and regular rhythm.      Heart sounds: Normal heart sounds.   Pulmonary:      Effort: Pulmonary effort is normal. No respiratory distress.      Breath sounds: Normal breath sounds.   Abdominal:      General: Bowel sounds are decreased.      Palpations: Abdomen is soft.      Tenderness: There is abdominal tenderness.       Neurological:      Mental Status: She is alert.       Component  Ref Range & Units 8/13/24  6:33 PM   Glucose Urine  Negative mg/dL Negative   Bilirubin Urine  Negative Negative   Ketones, UA  Negative - Trace mg/dL Negative   Spec Gravity  1.005 - 1.030 1.030   Blood Urine  Negative Negative   PH Urine  5.0 - 8.0 6.0   Protein Urine  Negative - Trace mg/dL 100 Abnormal    Urobilinogen Urine  0.2 - 1.0 mg/dL 0.2   Nitrite Urine  Negative Positive Abnormal    Leukocyte Esterase Urine  Negative Trace Abnormal    APPEARANCE  Clear Clear   Color Urine  Yellow Yellow   Multistix Lot#  Numeric [id number]   Multistix Expiration Date  Date 7/31/25      Hospital notes, radiology, labs and surgical notes reviewed  Assessment and Plan:  Problem List Items Addressed This Visit       Constipation - Primary     Miralax 1 capful daily until moving bowels easily         Relevant Medications    nitrofurantoin monohydrate macro 100 MG Oral Cap    Dysuria     Discussed urinalysis;send culture  Start macrobid 100 mg twice a day x 7 days  Supportive care discussed to help alleviate symptoms           Relevant Medications    nitrofurantoin monohydrate macro 100 MG Oral Cap    Other Relevant Orders    Urine Culture, Routine    URINALYSIS, AUTO, W/O SCOPE (Completed)    Nausea and vomiting     Discussed w pt to lighten foods to help decrease stomach upset  Please call if symptoms worsen or are not resolving.           S/P laparoscopic appendectomy    Relevant Medications    nitrofurantoin monohydrate macro 100 MG Oral Cap                   Discussed plan of care with pt and pt is in agreement.All questions answered. Pt to call with questions or concerns.    Encouraged to sign up for My Chart if not already registered.     Note to patient and family:  The 21st Century Cures Act makes medical notes available to patients in the interest of transparency.  However, please be advised that this is a medical document.  It is intended as a peer to peer communication.  It is written in medical language and may contain abbreviations or verbiage that are technical and unfamiliar.  It may appear blunt or direct.  Medical documents are intended to carry relevant information, facts as evident, and the clinical opinion of the practitioner.

## 2024-08-14 NOTE — ASSESSMENT & PLAN NOTE
Discussed urinalysis;send culture  Start macrobid 100 mg twice a day x 7 days  Supportive care discussed to help alleviate symptoms

## 2024-08-14 NOTE — ASSESSMENT & PLAN NOTE
Discussed w pt to lighten foods to help decrease stomach upset  Please call if symptoms worsen or are not resolving.

## 2024-08-24 ENCOUNTER — OFFICE VISIT (OUTPATIENT)
Dept: FAMILY MEDICINE CLINIC | Facility: CLINIC | Age: 34
End: 2024-08-24

## 2024-08-24 VITALS
SYSTOLIC BLOOD PRESSURE: 127 MMHG | HEART RATE: 73 BPM | DIASTOLIC BLOOD PRESSURE: 86 MMHG | RESPIRATION RATE: 18 BRPM | BODY MASS INDEX: 36.57 KG/M2 | HEIGHT: 63 IN | WEIGHT: 206.38 LBS

## 2024-08-24 DIAGNOSIS — G47.33 OSA (OBSTRUCTIVE SLEEP APNEA): Primary | ICD-10-CM

## 2024-08-24 DIAGNOSIS — R51.9 NONINTRACTABLE EPISODIC HEADACHE, UNSPECIFIED HEADACHE TYPE: ICD-10-CM

## 2024-08-24 DIAGNOSIS — G43.009 MIGRAINE WITHOUT AURA AND WITHOUT STATUS MIGRAINOSUS, NOT INTRACTABLE: ICD-10-CM

## 2024-08-24 DIAGNOSIS — H53.8 BLURRED VISION: ICD-10-CM

## 2024-08-24 PROCEDURE — 3079F DIAST BP 80-89 MM HG: CPT | Performed by: FAMILY MEDICINE

## 2024-08-24 PROCEDURE — 99214 OFFICE O/P EST MOD 30 MIN: CPT | Performed by: FAMILY MEDICINE

## 2024-08-24 PROCEDURE — 3074F SYST BP LT 130 MM HG: CPT | Performed by: FAMILY MEDICINE

## 2024-08-24 PROCEDURE — 3008F BODY MASS INDEX DOCD: CPT | Performed by: FAMILY MEDICINE

## 2024-08-24 RX ORDER — TOPIRAMATE 50 MG/1
50 TABLET, FILM COATED ORAL 2 TIMES DAILY
Qty: 60 TABLET | Refills: 3 | Status: SHIPPED | OUTPATIENT
Start: 2024-08-24

## 2024-08-24 RX ORDER — SUMATRIPTAN 50 MG/1
50 TABLET, FILM COATED ORAL
Qty: 9 TABLET | Refills: 1 | Status: SHIPPED | OUTPATIENT
Start: 2024-08-24

## 2024-08-24 NOTE — PROGRESS NOTES
2024  11:13 AM    Leonela Sy is a 33 year old female.    Chief complaint(s):   Chief Complaint   Patient presents with    Obstructive Sleep Apnea (MOE)     HPI:     Leonela Sy primary complaint is regarding multiple complaints.     Patient is a 33-year-old female female with history of migraine headaches for which she is being treated with Imitrex as well as  topiramate complaining of headaches improving but not completely resolved.  Also has been using her CPAP machine which she is feeling a lot better with her headaches as well.  Having no issues using the CPAP machine.  Also complaining of blurry vision associated with her headaches.  Requests referral to see an eye doctor.  Also request refills on her medications. Patient's last menstrual period was 2024 (approximate).     HISTORY:  Past Medical History:    Anxiety    Asthma (HCC)    High blood pressure    Nausea and vomiting    Visual impairment    wears eye glasses      Past Surgical History:   Procedure Laterality Date    Appendectomy        x2    x3    Cholecystectomy      Laparoscopic cholecystectomy  2017      Family History   Problem Relation Age of Onset    Diabetes Mother     Hypertension Mother     Obesity Mother     Other (dm) Mother     Other (htn) Mother     Hypertension Father     Asthma Father     Diabetes Father     Other (htn) Father     Colon Cancer Maternal Grandmother     Cancer Maternal Grandmother         uterine    Hypertension Maternal Grandmother     Diabetes Paternal Grandmother     Diabetes Paternal Grandfather       Social History:   Social History     Socioeconomic History    Marital status:    Tobacco Use    Smoking status: Former     Types: Cigarettes     Passive exposure: Never    Smokeless tobacco: Never    Tobacco comments:     socially when she was younger x 1 month   Vaping Use    Vaping status: Never Used   Substance and Sexual Activity    Alcohol  use: Not Currently     Comment: occasional    Drug use: Never    Sexual activity: Yes     Birth control/protection: Condom     Social Determinants of Health     Food Insecurity: Low Risk  (7/24/2024)    Received from [facility]    Food Insecurity     Have there been times that your food ran out, and you didn't have money to get more?: No     Are there times that you worry that this might happen?: No   Transportation Needs: Low Risk  (7/24/2024)    Received from [facility]    Transportation Needs     Do you have trouble getting transportation to medical appointments?: No   Housing Stability: Low Risk  (7/24/2024)    Received from [facility]    Housing Stability     Are you worried that your electric, gas, oil, or water might be shut off?: No     Are you concerned about having a safe and reliable place to live?: No        Immunizations:   Immunization History   Administered Date(s) Administered    Covid-19 Vaccine Pfizer 30 mcg/0.3 ml 12/16/2021    FLULAVAL 6 months & older 0.5 ml Prefilled syringe (28915) 01/21/2021, 03/03/2022    FLUZONE 6 months and older PFS 0.5 ml (31348) 09/25/2023    TDAP 06/03/2019, 04/09/2021, 07/26/2022       Medications (Active prior to today's visit):  Current Outpatient Medications   Medication Sig Dispense Refill    SUMAtriptan (IMITREX) 50 MG Oral Tab Take 1 tablet (50 mg total) by mouth Q24H PRN for Migraine. Use at onset; repeat once after 2 HRS-ONLY 2 IN 24 HR MAX 9 tablet 1    topiramate 50 MG Oral Tab Take 1 tablet (50 mg total) by mouth 2 (two) times daily. 60 tablet 3    temazepam 15 MG Oral Cap Take 1 capsule (15 mg total) by mouth nightly as needed. 90 capsule 1    Olmesartan Medoxomil-HCTZ 20-12.5 MG Oral Tab Take 1 tablet by mouth daily. 90 tablet 1       Allergies:  No Known Allergies      ROS:   Review of Systems   Constitutional:  Negative for appetite change and fever.   Eyes:  Negative for visual  disturbance.   Respiratory:  Positive for apnea. Negative for shortness of breath.    Cardiovascular:  Negative for chest pain.   Gastrointestinal:  Negative for abdominal pain, nausea and vomiting.   Musculoskeletal:  Negative for back pain.   Skin:  Negative for rash.   Neurological:  Positive for headaches. Negative for dizziness.       PHYSICAL EXAM:   VS: /86   Pulse 73   Resp 18   Ht 5' 3\" (1.6 m)   Wt 206 lb 6 oz (93.6 kg)   LMP 08/16/2024 (Approximate)   BMI 36.56 kg/m²     Physical Exam  Vitals reviewed.   Constitutional:       General: She is not in acute distress.     Appearance: Normal appearance.   HENT:      Head: Normocephalic.   Eyes:      Conjunctiva/sclera: Conjunctivae normal.   Cardiovascular:      Rate and Rhythm: Normal rate.   Pulmonary:      Effort: Pulmonary effort is normal.   Musculoskeletal:      Cervical back: Neck supple.   Skin:     Findings: No rash.   Psychiatric:         Mood and Affect: Mood normal.         LABORATORY RESULTS:     EKG / Spirometry : -     Radiology: No results found.     ASSESSMENT/PLAN:   Assessment   Encounter Diagnoses   Name Primary?    MOE (obstructive sleep apnea) Yes    Nonintractable episodic headache, unspecified headache type     Blurred vision     Migraine without aura and without status migrainosus, not intractable        1. MOE (obstructive sleep apnea)    Doing well  CPM  Follow up prn      2. Nonintractable episodic headache, unspecified headache type  3. Blurred vision  4. Migraine without aura and without status migrainosus, not intractable    LABORATORY & ORDERS: No orders of the defined types were placed in this encounter.     TESTS/PROCEDURES ordered today include : Additional workup:  Referral to Ophthalmology       MEDICATIONS:   SUMAtriptan (IMITREX) 50 MG Oral Tab, Take 1 tablet (50 mg total) by mouth Q24H PRN for Migraine. Use at onset; repeat once after 2 HRS-ONLY 2 IN 24 HR MAX, Disp: 9 tablet, Rfl: 1    topiramate 50 MG Oral  Tab, Take 1 tablet (50 mg total) by mouth 2 (two) times daily., Disp: 60 tablet, Rfl: 3    temazepam 15 MG Oral Cap, Take 1 capsule (15 mg total) by mouth nightly as needed., Disp: 90 capsule, Rfl: 1    Olmesartan Medoxomil-HCTZ 20-12.5 MG Oral Tab, Take 1 tablet by mouth daily., Disp: 90 tablet, Rfl: 1    Refills Given today:    Requested Prescriptions     Signed Prescriptions Disp Refills    SUMAtriptan (IMITREX) 50 MG Oral Tab 9 tablet 1     Sig: Take 1 tablet (50 mg total) by mouth Q24H PRN for Migraine. Use at onset; repeat once after 2 HRS-ONLY 2 IN 24 HR MAX    topiramate 50 MG Oral Tab 60 tablet 3     Sig: Take 1 tablet (50 mg total) by mouth 2 (two) times daily.   .    REFERRALS: 1OPHTHALMOLOGY - EXTERNAL,       Procedures    OPHTHALMOLOGY - EXTERNAL   .  RECOMMENDATIONS given include: increase oral fluid intake, modify diet to avoid known headache triggers, and maintain normal sleep patterns. Advised to keep a headache diary.  Counseled regarding lifestyle modifications.  Further diagnostic evaluation per orders.  Medication changes per orders.,  sleep hygiene, avoid caffeine .  FOLLOW-UP:  Instructed to call if she develops new or worsening symptoms, including worsening intensity and confusion.   Schedule a follow-up appointment in 3 months.             Orders This Visit:  No orders of the defined types were placed in this encounter.      Meds This Visit:  Requested Prescriptions     Signed Prescriptions Disp Refills    SUMAtriptan (IMITREX) 50 MG Oral Tab 9 tablet 1     Sig: Take 1 tablet (50 mg total) by mouth Q24H PRN for Migraine. Use at onset; repeat once after 2 HRS-ONLY 2 IN 24 HR MAX    topiramate 50 MG Oral Tab 60 tablet 3     Sig: Take 1 tablet (50 mg total) by mouth 2 (two) times daily.       Imaging & Referrals:  OPHTHALMOLOGY - EXTERNAL         TANVIR CROCKETT MD

## 2024-10-24 DIAGNOSIS — I10 PRIMARY HYPERTENSION: ICD-10-CM

## 2024-10-25 RX ORDER — OLMESARTAN MEDOXOMIL AND HYDROCHLOROTHIAZIDE 20/12.5 20; 12.5 MG/1; MG/1
1 TABLET ORAL DAILY
Qty: 90 TABLET | Refills: 3 | Status: SHIPPED | OUTPATIENT
Start: 2024-10-25

## 2024-10-25 NOTE — TELEPHONE ENCOUNTER
Refill Per Protocol     Requested Prescriptions   Pending Prescriptions Disp Refills    OLMESARTAN MEDOXOMIL-HCTZ 20-12.5 MG Oral Tab [Pharmacy Med Name: OLMESARTAN MEDOX/HCTZ 20-12.5MG TAB] 90 tablet 1     Sig: Take 1 tablet by mouth daily.       Hypertension Medications Protocol Passed - 10/25/2024  3:58 PM        Passed - CMP or BMP in past 12 months        Passed - Last BP reading less than 140/90     BP Readings from Last 1 Encounters:   08/24/24 127/86               Passed - In person appointment or virtual visit in the past 12 mos or appointment in next 3 mos     Recent Outpatient Visits              2 months ago [facility]ive [facility]    [facility]    2 months ago [facility], unspecified [facility] type    [facility]    [facility] Visit    4 months ago Obstructive sleep apnea syndrome    [facility]    Office Visit    4 months ago Migraine without aura and without status migrainosus, not intractable    [facility]    [facility] Visit    4 months ago Sleep apnea, unspecified type    [facility]    Office Visit          Future Appointments         Provider Department Appt Notes    In 1 month [facility]                    Passed - EGFRCR or GFRNAA > 50     GFR Evaluation  EGFRCR: 111 , resulted on 5/1/2024                 Future Appointments         Provider Department Appt Notes    In 1 month [facility]          Recent Outpatient Visits              2 months ago MOE (obstructive sleep apnea)    [facility]    Office Visit    2 months ago Constipation,  unspecified constipation type    [facility] [address], Beryl Maloney APRN    Office Visit    4 months ago Obstructive sleep apnea syndrome    [facility]    Office Visit    4 months ago Migraine without aura and without status migrainosus, not intractable    [facility] [address], Patrick Rivera MD    Office Visit    4 months ago Sleep apnea, unspecified type    [facility]    Office Visit

## 2025-03-12 ENCOUNTER — TELEPHONE (OUTPATIENT)
Dept: FAMILY MEDICINE CLINIC | Facility: CLINIC | Age: 35
End: 2025-03-12

## 2025-03-12 DIAGNOSIS — I10 PRIMARY HYPERTENSION: ICD-10-CM

## 2025-03-12 DIAGNOSIS — G47.00 INSOMNIA, UNSPECIFIED TYPE: ICD-10-CM

## 2025-03-12 NOTE — TELEPHONE ENCOUNTER
Refill request    Not listed:  Four (4)    Vitamin D2 50,000IU(ERGO) Cap RX   Sig: Take  Capsule by mouth 1 time a week.  Qty: 12    Medroxyprogesterone 10mg Tablets  Sig: Take 1 tablts (1mg) by mouth daily for 10 days.  Qty:10    Hydrochlorothiazide 12.5mg Capsules  SIg:  Take 1 capsule (12.5mg) by mouth daily.  Qty: 30    Cylinamycin/BPO 1.2-5% Gel 45gm  Sig:  Apply topically to the affected area every night.  Qty: 45        Current Outpatient Medications   Medication Sig Dispense Refill

## 2025-03-14 DIAGNOSIS — G43.009 MIGRAINE WITHOUT AURA AND WITHOUT STATUS MIGRAINOSUS, NOT INTRACTABLE: ICD-10-CM

## 2025-03-14 RX ORDER — CLINDAMYCIN PHOSPHATE AND BENZOYL PEROXIDE 10; 50 MG/G; MG/G
GEL TOPICAL
Qty: 45 G | Refills: 1 | OUTPATIENT
Start: 2025-03-14

## 2025-03-14 RX ORDER — OLMESARTAN MEDOXOMIL AND HYDROCHLOROTHIAZIDE 20/12.5 20; 12.5 MG/1; MG/1
1 TABLET ORAL DAILY
Qty: 90 TABLET | Refills: 3 | OUTPATIENT
Start: 2025-03-14

## 2025-03-14 RX ORDER — HYDROCHLOROTHIAZIDE 12.5 MG/1
12.5 CAPSULE ORAL DAILY
Qty: 30 CAPSULE | Refills: 3 | OUTPATIENT
Start: 2025-03-14

## 2025-03-14 RX ORDER — TOPIRAMATE 25 MG/1
25 TABLET, FILM COATED ORAL 2 TIMES DAILY
Qty: 60 TABLET | Refills: 2 | OUTPATIENT
Start: 2025-03-14

## 2025-03-14 RX ORDER — ERGOCALCIFEROL 1.25 MG/1
50000 CAPSULE, LIQUID FILLED ORAL WEEKLY
Refills: 0 | OUTPATIENT
Start: 2025-03-14

## 2025-03-14 RX ORDER — TEMAZEPAM 15 MG/1
15 CAPSULE ORAL NIGHTLY PRN
Qty: 90 CAPSULE | Refills: 0 | OUTPATIENT
Start: 2025-03-14

## 2025-03-14 RX ORDER — MEDROXYPROGESTERONE ACETATE 10 MG
10 TABLET ORAL DAILY
Qty: 10 TABLET | Refills: 0 | OUTPATIENT
Start: 2025-03-14 | End: 2025-03-24

## 2025-03-14 NOTE — TELEPHONE ENCOUNTER
Olmesartan Medoxomil- hydrochlorothiazide 20-12.5 mg : was sent on 10/25/2024 for a year supply to [facility] in [place].       Outpatient Medication Detail     Disp Refills Start End    Olmesartan Medoxomil-HCTZ 20-12.5 MG Oral Tab 90 tablet 3 10/25/2024 --    Sig - Route: Take 1 tablet by mouth daily. - Oral    Sent to pharmacy as: Olmesartan Medoxomil-HCTZ 20-12.5 MG Oral Tablet (BENICAR HCT)    E-Prescribing Status: Receipt confirmed by pharmacy (10/25/2024  3:59 PM CDT)      Topiramate 25 mg : is marked as Physician Directed discontinue 06/19/2024 office visit with Dr. Patrick Castillo. Topiramate 50 mg was given during office visit 06/19/2024.  ASSESSMENT/PLAN:      Assessment       Encounter Diagnoses   Name Primary?    Migraine without aura and without status migrainosus, not intractable Yes    Sleep apnea, unspecified type      Insomnia, unspecified type           1. Migraine without aura and without status migrainosus, not intractable     MEDICATIONS:   Current Medications     topiramate 50 MG Oral Tab, Take 1 tablet (50 mg total) by mouth 2 (two) times daily., Disp: 60 tablet, Rfl: 3    temazepam 15 MG Oral Cap, Take 1 capsule (15                                  Refills Given today:    Requested Prescriptions             Signed Prescriptions Disp Refills    topiramate 50 MG Oral Tab 60 tablet 3

## 2025-03-14 NOTE — TELEPHONE ENCOUNTER
Please call and triage patient. These prescriptions are not active on medication list.  All were written and discontinued in 2023.

## 2025-03-14 NOTE — TELEPHONE ENCOUNTER
With Japanese interpretor ID #[id number] Alondra patient stated that she was returning our call. Patient was inform of message below. Patient stated that she does not need refill on the 4 medication below. She does need her migraine medication refilled. I will start a new refilll encounter.

## 2025-03-14 NOTE — TELEPHONE ENCOUNTER
Please review.  Protocol failed/has no protocol.    Recent fills each # 90 : 2024  Last prescription written: 2024  Last office visit:  2024    Script was written for #90 and 1 refill on 2024.Contacted Pharmacy and confirmed script was  on 2024  Future Appointments   Date Time Provider Department Center   3/25/2025 11:30 AM Patrick Castillo MD [facility]

## 2025-03-18 RX ORDER — SUMATRIPTAN 50 MG/1
50 TABLET, FILM COATED ORAL
Qty: 9 TABLET | Refills: 1 | OUTPATIENT
Start: 2025-03-18

## 2025-03-24 NOTE — TELEPHONE ENCOUNTER
Refill Request:      Current Outpatient Medications   Medication Sig Dispense Refill    ergocalciferol 1.25 MG (63584 UT) Oral Cap   Take 1 capsule (50,000 Units total) by mouth once a week. - Oral        Medication was discontinued 4/30/2024    Please advise

## 2025-03-25 ENCOUNTER — HOSPITAL ENCOUNTER (OUTPATIENT)
Dept: GENERAL RADIOLOGY | Age: 35
Discharge: HOME | End: 2025-03-25
Attending: FAMILY MEDICINE
Payer: COMMERCIAL

## 2025-03-25 ENCOUNTER — OFFICE VISIT (OUTPATIENT)
Dept: FAMILY MEDICINE CLINIC | Facility: CLINIC | Age: 35
End: 2025-03-25

## 2025-03-25 VITALS
WEIGHT: 199 LBS | BODY MASS INDEX: 35.26 KG/M2 | SYSTOLIC BLOOD PRESSURE: 132 MMHG | HEIGHT: 63 IN | HEART RATE: 90 BPM | DIASTOLIC BLOOD PRESSURE: 92 MMHG

## 2025-03-25 DIAGNOSIS — S96.912A STRAIN OF LEFT ANKLE, INITIAL ENCOUNTER: ICD-10-CM

## 2025-03-25 DIAGNOSIS — E66.09 CLASS 2 OBESITY DUE TO EXCESS CALORIES WITHOUT SERIOUS COMORBIDITY WITH BODY MASS INDEX (BMI) OF 35.0 TO 35.9 IN ADULT: ICD-10-CM

## 2025-03-25 DIAGNOSIS — G57.10 MERALGIA PARESTHETICA, UNSPECIFIED LATERALITY: ICD-10-CM

## 2025-03-25 DIAGNOSIS — I10 PRIMARY HYPERTENSION: ICD-10-CM

## 2025-03-25 DIAGNOSIS — E66.812 CLASS 2 OBESITY DUE TO EXCESS CALORIES WITHOUT SERIOUS COMORBIDITY WITH BODY MASS INDEX (BMI) OF 35.0 TO 35.9 IN ADULT: ICD-10-CM

## 2025-03-25 DIAGNOSIS — S96.912A STRAIN OF LEFT ANKLE, INITIAL ENCOUNTER: Primary | ICD-10-CM

## 2025-03-25 PROCEDURE — 3008F BODY MASS INDEX DOCD: CPT | Performed by: FAMILY MEDICINE

## 2025-03-25 PROCEDURE — 3080F DIAST BP >= 90 MM HG: CPT | Performed by: FAMILY MEDICINE

## 2025-03-25 PROCEDURE — 99214 OFFICE O/P EST MOD 30 MIN: CPT | Performed by: FAMILY MEDICINE

## 2025-03-25 PROCEDURE — 3075F SYST BP GE 130 - 139MM HG: CPT | Performed by: FAMILY MEDICINE

## 2025-03-25 PROCEDURE — 73610 X-RAY EXAM OF ANKLE: CPT | Performed by: FAMILY MEDICINE

## 2025-03-25 RX ORDER — OLMESARTAN MEDOXOMIL AND HYDROCHLOROTHIAZIDE 20/12.5 20; 12.5 MG/1; MG/1
1 TABLET ORAL DAILY
Qty: 90 TABLET | Refills: 3 | Status: SHIPPED | OUTPATIENT
Start: 2025-03-25

## 2025-03-25 NOTE — PROGRESS NOTES
3/25/2025  11:37 AM    Leonela Sy is a 34 year old female.    Chief complaint(s):   Chief Complaint   Patient presents with    Foot Pain     Left foot pain     Numbness     Bilateral thigh numbness and burning     Menstrual Problem     HPI:     Leonela Sy primary complaint is regarding as above.     Patient is a 34-year-old female who presents complaining of left ankle pain laterally and posteriorly for the past 3 months.  Denies any trauma accidents or injuries.  There is no paresthesia involving the ankle or the foot.  Nevertheless she does complain of upper thigh numbness and tingling sensation especially when she is standing and especially on the lateral aspects of the thighs.      Leonela Syis a 32 year old female presents with hypertension.  This was first diagnosed 2023.  Current nonpharmacologic treatment includes low sodium diet, exercise, and meditation.  Her current cardiac medication(s) regimen includes: Olmesartan-HCTZ 20-12.5 mg .  She has not kept a blood pressure diary, but states that her blood pressures have been poorly controll.  she is tolerating her medication(s)  well without side effects.  Compliance with treatment has been good.  Noticed increase in weight.     HISTORY:  Past Medical History:    Anxiety    Asthma (HCC)    High blood pressure    Nausea and vomiting    Visual impairment    wears eye glasses      Past Surgical History:   Procedure Laterality Date    Appendectomy        x2    x3    Cholecystectomy      Laparoscopic cholecystectomy  2017      Family History   Problem Relation Age of Onset    Diabetes Mother     Hypertension Mother     Obesity Mother     Other (dm) Mother     Other (htn) Mother     Hypertension Father     Asthma Father     Diabetes Father     Other (htn) Father     Colon Cancer Maternal Grandmother     Cancer Maternal Grandmother         uterine    Hypertension Maternal  Grandmother     Diabetes Paternal Grandmother     Diabetes Paternal Grandfather       Social History:   Social History     Socioeconomic History    Marital status:    Tobacco Use    Smoking status: Former     Types: Cigarettes     Passive exposure: Never    Smokeless tobacco: Never    Tobacco comments:     socially when she was younger x 1 month   Vaping Use    Vaping status: Never Used   Substance and Sexual Activity    Alcohol use: Not Currently     Comment: occasional    Drug use: Never    Sexual activity: Yes     Birth control/protection: Condom     Social Drivers of Health     Food Insecurity: Low Risk  (7/24/2024)    Received from [facility]    Food Insecurity     Have there been times that your food ran out, and you didn't have money to get more?: No     Are there times that you worry that this might happen?: No   Transportation Needs: Low Risk  (7/24/2024)    Received from [facility]    Transportation Needs     Do you have trouble getting transportation to medical appointments?: No   Housing Stability: Low Risk  (7/24/2024)    Received from [facility]    Housing Stability     Are you worried that your electric, gas, oil, or water might be shut off?: No     Are you concerned about having a safe and reliable place to live?: No        Immunizations:   Immunization History   Administered Date(s) Administered    Covid-19 Vaccine Pfizer 30 mcg/0.3 ml 12/16/2021    FLULAVAL 6 months & older 0.5 ml Prefilled syringe (08142) 01/21/2021, 03/03/2022    FLUZONE 6 months and older PFS 0.5 ml (48425) 09/25/2023    Influenza Vaccine, trivalent (IIV3), PF 0.5mL (18648) 12/06/2024    TDAP 06/03/2019, 04/09/2021, 07/26/2022       Medications (Active prior to today's visit):  Current Outpatient Medications   Medication Sig Dispense Refill    diclofenac 1 % External Gel Apply 4 g topically 4 (four) times daily. Apply to affected area(s). 60 g 2     Olmesartan Medoxomil-HCTZ 20-12.5 MG Oral Tab Take 1 tablet by mouth daily. 90 tablet 3    topiramate 50 MG Oral Tab Take 1 tablet (50 mg total) by mouth 2 (two) times daily. 60 tablet 3    temazepam 15 MG Oral Cap Take 1 capsule (15 mg total) by mouth nightly as needed. 90 capsule 1       Allergies:  Allergies[1]      ROS:   Review of Systems   Constitutional:  Positive for unexpected weight change (gain). Negative for appetite change and fever.   Eyes:  Negative for visual disturbance.   Respiratory:  Negative for shortness of breath.    Cardiovascular:  Negative for chest pain.   Gastrointestinal:  Negative for abdominal pain, nausea and vomiting.   Musculoskeletal:  Negative for back pain.        Left ankle pain   Skin:  Negative for rash.   Neurological:  Positive for numbness (upper thights). Negative for dizziness and headaches.       PHYSICAL EXAM:   VS: BP (!) 132/92 (BP Location: Right arm, Patient Position: Sitting, Cuff Size: adult)   Pulse 90   Ht 5' 3\" (1.6 m)   Wt 199 lb (90.3 kg)   LMP 01/20/2025 (Approximate)   BMI 35.25 kg/m²     Physical Exam  Vitals reviewed.   Constitutional:       General: She is not in acute distress.     Appearance: Normal appearance. She is obese.   HENT:      Head: Normocephalic.   Eyes:      Conjunctiva/sclera: Conjunctivae normal.   Cardiovascular:      Rate and Rhythm: Normal rate.   Pulmonary:      Effort: Pulmonary effort is normal.   Musculoskeletal:      Cervical back: Neck supple.   Feet:      Comments: + left posterior ankle tenderness, no swelling  Skin:     Findings: No rash.   Psychiatric:         Mood and Affect: Mood normal.         LABORATORY RESULTS:     EKG / Spirometry : -     Radiology: No results found.     ASSESSMENT/PLAN:   Assessment   Encounter Diagnoses   Name Primary?    Strain of left ankle, initial encounter Yes    Meralgia paresthetica, unspecified laterality     Class 2 obesity due to excess calories without serious comorbidity with body  mass index (BMI) of 35.0 to 35.9 in adult     Primary hypertension        1. Strain of left ankle, initial encounter    MEDICATIONS:     Requested Prescriptions     Signed Prescriptions Disp Refills    diclofenac 1 % External Gel 60 g 2     Sig: Apply 4 g topically 4 (four) times daily. Apply to affected area(s).     REFERRALS: PHYSICAL THERAPY - INTERNAL,       Procedures    PHYSICAL THERAPY - INTERNAL        RECOMMENDATIONS given include: Patient was reassured of  her medical condition and all questions and concerns were answered. Patient was informed to please, call our office with any new or further questions or concerns that may come up in the near future. Notify Dr Castillo or the [facility] if there is a deterioration or worsening of the medical condition. Also, inform the doctor with any new symptoms or medications' side effects.      FOLLOW-UP: Schedule a follow-up visit in 4 weeks.         2. Meralgia paresthetica, unspecified laterality  3. Class 2 obesity due to excess calories without serious comorbidity with body mass index (BMI) of 35.0 to 35.9 in adult    Weight loss plan  Walking exercise  Low calories diet  Follow up [facility]    4. Primary hypertension    MEDICATIONS:    Olmesartan Medoxomil-HCTZ 20-12.5 MG Oral Tab 90 tablet 3     Sig: Take 1 tablet by mouth daily.      LABORATORY & ORDERS: No orders of the defined types were placed in this encounter.    RECOMMENDATIONS given include: avoid pseudoephedrine or other stimulants/decongestants in common cold remedies, decrease consumption of alcohol, perform routine monitoring of blood pressure with home blood pressure cuff, exercise, reduction of dietary salt intake, take medication as prescribed, try not to miss doses, smoking cessation, weight loss, and stress reduction.    FOLLOW-UP: Schedule a follow-up visit in 6 months.                Orders This Visit:  No orders of the defined types were placed in this encounter.      Meds This  Visit:  Requested Prescriptions     Signed Prescriptions Disp Refills    diclofenac 1 % External Gel 60 g 2     Sig: Apply 4 g topically 4 (four) times daily. Apply to affected area(s).    Olmesartan Medoxomil-HCTZ 20-12.5 MG Oral Tab 90 tablet 3     Sig: Take 1 tablet by mouth daily.       Imaging & Referrals:  PHYSICAL THERAPY - INTERNAL         TANVIR CROCKETT MD         [1] No Known Allergies

## 2025-03-25 NOTE — TELEPHONE ENCOUNTER
Refill Request:    Current Outpatient Medications   Medication Sig Dispense Refill    medroxyPROGESTERone Acetate 10 MG Oral Tab ()  Take 1 tablet (10 mg total) by mouth daily for 10 days. For 10 days  10 tablet        Clindamycin Phos-Benzoyl Perox (DUAC) 1.2-5 % External Gel (Discontinued)  Apply 1 Application topically nightly.  45 g     hydroCHLOROthiazide 12.5 MG Oral Cap (Discontinued)  Take 1 capsule (12.5 mg total) by mouth daily.  30 capsule        medroxyPROGESTERone medication ended 2023.  Clindamycin Phos-Benzoyl Perox medication ended 2023  hydroCHLOROthiazide medication ended 2023    Please advise

## 2025-03-28 RX ORDER — ERGOCALCIFEROL 1.25 MG/1
50000 CAPSULE, LIQUID FILLED ORAL WEEKLY
Refills: 0 | OUTPATIENT
Start: 2025-03-28

## 2025-03-28 NOTE — TELEPHONE ENCOUNTER
Please see Telephone Encounter From 03/12/2025    With Kinyarwanda interpretor ID #[id number] Alondra patient stated that she was returning our call. Patient was inform of message below. Patient stated that she does not need refill on the 4 medication below. She does need her migraine medication refilled. I will start a new refilll encounter.         Refill request     Not listed:  Four (4)     Vitamin D2 50,000IU(ERGO) Cap RX   Sig: Take  Capsule by mouth 1 time a week.  Qty: 12

## 2025-03-29 RX ORDER — HYDROCHLOROTHIAZIDE 12.5 MG/1
12.5 CAPSULE ORAL DAILY
Qty: 30 CAPSULE | Refills: 3 | Status: SHIPPED | OUTPATIENT
Start: 2025-03-29

## 2025-03-29 RX ORDER — CLINDAMYCIN PHOSPHATE AND BENZOYL PEROXIDE 10; 50 MG/G; MG/G
GEL TOPICAL
Qty: 45 G | Refills: 1 | Status: SHIPPED | OUTPATIENT
Start: 2025-03-29

## 2025-03-29 RX ORDER — MEDROXYPROGESTERONE ACETATE 10 MG
10 TABLET ORAL DAILY
Qty: 10 TABLET | Refills: 0 | Status: SHIPPED | OUTPATIENT
Start: 2025-03-29 | End: 2025-04-08

## (undated) DEVICE — ENCORE® LATEX MICRO SIZE 7.5, STERILE LATEX POWDER-FREE SURGICAL GLOVE: Brand: ENCORE

## (undated) DEVICE — ENSEAL X1 TISSUE SEALER, CURVED JAW, 37 CM SHAFT LENGTH: Brand: ENSEAL

## (undated) DEVICE — ADHESIVE SKIN TOP FOR WND CLSR DERMBND ADV

## (undated) DEVICE — TROCAR: Brand: KII SLEEVE

## (undated) DEVICE — UNDYED BRAIDED (POLYGLACTIN 910), SYNTHETIC ABSORBABLE SUTURE: Brand: COATED VICRYL

## (undated) DEVICE — SOLUTION IRRIG 3000ML 0.9% NACL FLX CONT

## (undated) DEVICE — SOLUTION ANTIFOG W/ ADH BK FOAM SPNG RADPQ

## (undated) DEVICE — SOLUTION IRRIG 1000ML 0.9% NACL USP BTL

## (undated) DEVICE — SOLUTION IRRIG 1000ML ST H2O AQUALITE PLAS

## (undated) DEVICE — TROCAR: Brand: KII FIOS FIRST ENTRY

## (undated) DEVICE — LAPAROSCOPY: Brand: MEDLINE INDUSTRIES, INC.

## (undated) DEVICE — ARISTA AH ABSORBABLE HEMOSTATIC PARTICLES: Brand: ARISTA™ AH

## (undated) DEVICE — INSUFFLATION NEEDLE TO ESTABLISH PNEUMOPERITONEUM.: Brand: INSUFFLATION NEEDLE

## (undated) DEVICE — GAMMEX® PI HYBRID SIZE 7, STERILE POWDER-FREE SURGICAL GLOVE, POLYISOPRENE AND NEOPRENE BLEND: Brand: GAMMEX

## (undated) DEVICE — 60 ML SYRINGE LUER-LOCK TIP: Brand: MONOJECT

## (undated) DEVICE — INJECTOR MANIP L13CM DIA5MM BLLN TIP KRONNER

## (undated) DEVICE — STERILE SURGICAL LUBRICANT, METAL TUBE: Brand: SURGILUBE

## (undated) NOTE — LETTER
Raoul Vasquez Md  [address] [facility], [address]       07/30/20        Patient: Lolis Her   YOB: 1990   Date of Visit: 7/30/2020       Dear  Dr. Karsten White MD,      Thank you for referring Lolis Her

## (undated) NOTE — LETTER
3/18/2022              Rojelio Black        [id number]        [place]  [id number]         To Whom It May Concern,    The above named patient is currently under my prenatal care. At this time, my patient would benefit from not lifting greater than 35 lbs and having the opportunity to use the bathroom every 2 hours. Please contact my office with any questions.      Sincerely,    MD Marisol Estes   [address] [place] [address] [phone]

## (undated) NOTE — LETTER
7/14/2022          To Whom It May Concern:    Glynn Wiggins is currently under my medical care and may return to work at this time. She may return to work on 07/15/22. 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 Limit use of her hands. If you require additional information please contact our office.         Sincerely,    Gustavo Loco MD          Document generated by:  Gustavo Loco MD

## (undated) NOTE — LETTER
10/11/19        Lowell [facility]  [id number]  [place],    Nuestros registros indican que tiene análisis clínicos pendientes y/o pruebas que se ordenaron en quinones nombre que no se khan Keenan

## (undated) NOTE — LETTER
05/27/21        [address]  [place] [address]      Dear Danyell Ramey,    Our records indicate that you have outstanding lab work and or testing that was ordered for you and has not yet been completed:  Order

## (undated) NOTE — LETTER
7/8/2022              [facility]        [address]        [address] [facility]         To Whom It May Concern,    The above named patient is currently under our care for pregnancy. It is permissible at her gestational age for dental work to be performed. However, if x-rays are needed, please make sure that the abdomen and thyroid gland are shielded appropriately, and thoroughly. Analgesia is also permissible as long as there are no vasoconstrictors used. For post treatment pain relief, Tylenol or Tylenol #3 is acceptable. If an antibiotic is needed, a penicillin derivative may be used. If you have any additional concerns, do not hesitate to contact our office at .       Sincerely,    Sasha Saucedo MD  [address] [place]  [address] [place] [address]  [phone]

## (undated) NOTE — LETTER
VACCINE ADMINISTRATION RECORD  PARENT / GUARDIAN APPROVAL  Date: 2022  Vaccine administered to: Alexandria Boyd     : 10/9/1990    MRN: [id number]    A copy of the appropriate Centers for Disease Control and Prevention Vaccine Information statement has been provided. I have read or have had explained the information about the diseases and the vaccines listed below. There was an opportunity to ask questions and any questions were answered satisfactorily. I believe that I understand the benefits and risks of the vaccine cited and ask that the vaccine(s) listed below be given to me or to the person named above (for whom I am authorized to make this request). VACCINES ADMINISTERED:  Tdap    I have read and hereby agree to be bound by the terms of this agreement as stated above. My signature is valid until revoked by me in writing. This document is signed by self, relationship: Self on 2022.:                                                                                                                                         Parent / Jerrell Nunez Signature                                                Date    Maty Cordero served as a witness to authentication that the identity of the person signing electronically is in fact the person represented as signing. This document was generated by Maty Cordero on 2022.

## (undated) NOTE — LETTER
12/7/2023              [facility] [address]        [id number]        [place] [id number]       To whom it may concern,    The above named patient is currently under my care, please excuse her from work from 12/6-12/11/23. Please do not hesitate to contact the office with any questions. Sincerely,    Marlen Gonzalez.  Wilman Alcantar MD  [facility], [address] [place] [address]  [phone]

## (undated) NOTE — LETTER
5/2/2020              Ema Ashley        [id number]         Dear Arminda Shah,    This letter is to inform you that our office has made several attempts to reach you by phone without success.   We were attempting to contact y